# Patient Record
Sex: MALE | Race: WHITE | NOT HISPANIC OR LATINO | Employment: FULL TIME | URBAN - METROPOLITAN AREA
[De-identification: names, ages, dates, MRNs, and addresses within clinical notes are randomized per-mention and may not be internally consistent; named-entity substitution may affect disease eponyms.]

---
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
## 2019-02-15 ENCOUNTER — TELEPHONE (OUTPATIENT)
Dept: FAMILY MEDICINE | Facility: CLINIC | Age: 19
End: 2019-02-15

## 2019-02-15 DIAGNOSIS — D69.3 IDIOPATHIC THROMBOCYTOPENIC PURPURA (H): Primary | ICD-10-CM

## 2019-02-15 DIAGNOSIS — K08.409 H/O TOOTH EXTRACTION: Primary | ICD-10-CM

## 2019-02-15 LAB
BASOPHILS # BLD AUTO: 0 10E9/L (ref 0–0.2)
BASOPHILS NFR BLD AUTO: 0.6 %
DIFFERENTIAL METHOD BLD: ABNORMAL
EOSINOPHIL # BLD AUTO: 0.2 10E9/L (ref 0–0.7)
EOSINOPHIL NFR BLD AUTO: 3.5 %
ERYTHROCYTE [DISTWIDTH] IN BLOOD BY AUTOMATED COUNT: 12.5 % (ref 10–15)
HCT VFR BLD AUTO: 49.1 % (ref 40–53)
HGB BLD-MCNC: 16.9 G/DL (ref 13.3–17.7)
LYMPHOCYTES # BLD AUTO: 2.2 10E9/L (ref 0.8–5.3)
LYMPHOCYTES NFR BLD AUTO: 33.3 %
MCH RBC QN AUTO: 29.2 PG (ref 26.5–33)
MCHC RBC AUTO-ENTMCNC: 34.4 G/DL (ref 31.5–36.5)
MCV RBC AUTO: 85 FL (ref 78–100)
MONOCYTES # BLD AUTO: 0.6 10E9/L (ref 0–1.3)
MONOCYTES NFR BLD AUTO: 8.9 %
NEUTROPHILS # BLD AUTO: 3.5 10E9/L (ref 1.6–8.3)
NEUTROPHILS NFR BLD AUTO: 53.7 %
PLATELET # BLD AUTO: 59 10E9/L (ref 150–450)
RBC # BLD AUTO: 5.78 10E12/L (ref 4.4–5.9)
WBC # BLD AUTO: 6.5 10E9/L (ref 4–11)

## 2019-02-15 PROCEDURE — 36415 COLL VENOUS BLD VENIPUNCTURE: CPT | Performed by: DENTIST

## 2019-02-15 PROCEDURE — 85025 COMPLETE CBC W/AUTO DIFF WBC: CPT | Performed by: DENTIST

## 2019-02-15 NOTE — TELEPHONE ENCOUNTER
Has not seen Dr. Sandhu since 2017  Has seen Evelyn Mendez NP and Dr. Hackett more recently    Please advise    Priscila Ashby RN    
Order placed.   Called and spoke with Julissa from [facility]. States they just got the fax from our office.   Patient completed CBC and was received. No further labs needed.     Alexus Montanez RN  
Reason for Call:  Other call back    Detailed comments: [facility] Wanting to check platelets before performing surgery. Please advise.     Fax: [phone]  Phone Number Patient can be reached at: Other phone number: [phone]     Best Time: any     Can we leave a detailed message on this number? YES    Call taken on 2/15/2019 at 11:43 AM by Reg Lugo    
Thelma for CBC with platelets (indication ITP).    Evelyn Mendez, CNP    
no fever and no chills.

## 2019-03-04 ENCOUNTER — OFFICE VISIT (OUTPATIENT)
Dept: PSYCHOLOGY | Facility: CLINIC | Age: 19
End: 2019-03-04
Attending: PEDIATRICS
Payer: COMMERCIAL

## 2019-03-04 DIAGNOSIS — F32.1 MODERATE SINGLE CURRENT EPISODE OF MAJOR DEPRESSIVE DISORDER (H): Primary | ICD-10-CM

## 2019-03-04 DIAGNOSIS — F41.1 GAD (GENERALIZED ANXIETY DISORDER): ICD-10-CM

## 2019-03-04 PROCEDURE — 90791 PSYCH DIAGNOSTIC EVALUATION: CPT | Performed by: COUNSELOR

## 2019-03-04 NOTE — Clinical Note
Good evening Evelyn,I met with Fredy yesterday. He stated that he has now started seeing you as his primary provider. He and I talked a lot about his symptoms and what he thinks will be beneficial at helping him be successful. He is also scheduled to see me again. I did tell him that if at some point he would prefer a male therapist, that is also an option.Cydney
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
## 2019-05-02 ENCOUNTER — OFFICE VISIT (OUTPATIENT)
Dept: FAMILY MEDICINE | Facility: CLINIC | Age: 19
End: 2019-05-02
Payer: COMMERCIAL

## 2019-05-02 VITALS
HEART RATE: 93 BPM | WEIGHT: 257 LBS | OXYGEN SATURATION: 98 % | TEMPERATURE: 99.3 F | SYSTOLIC BLOOD PRESSURE: 127 MMHG | DIASTOLIC BLOOD PRESSURE: 79 MMHG | RESPIRATION RATE: 20 BRPM | BODY MASS INDEX: 36.1 KG/M2

## 2019-05-02 DIAGNOSIS — M76.31 ILIOTIBIAL BAND SYNDROME, RIGHT: ICD-10-CM

## 2019-05-02 DIAGNOSIS — Z11.4 SCREENING FOR HIV (HUMAN IMMUNODEFICIENCY VIRUS): Primary | ICD-10-CM

## 2019-05-02 DIAGNOSIS — S76.011A STRAIN OF FLEXOR MUSCLE OF RIGHT HIP, INITIAL ENCOUNTER: ICD-10-CM

## 2019-05-02 PROCEDURE — 99214 OFFICE O/P EST MOD 30 MIN: CPT | Performed by: FAMILY MEDICINE

## 2019-05-02 ASSESSMENT — PAIN SCALES - GENERAL: PAINLEVEL: MILD PAIN (3)

## 2019-05-02 NOTE — NURSING NOTE
"Chief Complaint   Patient presents with     Musculoskeletal Problem     right side hip pain, mildly for about 1 month and Monday started to get worse     Health Maintenance     order pended,       Initial /79   Pulse 93   Temp 99.3  F (37.4  C) (Oral)   Resp 20   Wt 116.6 kg (257 lb)   SpO2 98%   BMI 36.10 kg/m   Estimated body mass index is 36.1 kg/m  as calculated from the following:    Height as of 12/26/18: 1.797 m (5' 10.75\").    Weight as of this encounter: 116.6 kg (257 lb).  Medication Reconciliation: complete  Anamaria Segovia CMA  "

## 2019-05-02 NOTE — LETTER
[facility]  [address] [facility] [id number] [phone]          May 2, 2019    RE:  Fredy Mora                                                                                                                                                       [address] [place] [id number]            To whom it may concern:    Fredy Mora is under my professional care for    Strain of flexor muscle of right hip, initial encounter  Iliotibial band syndrome, right  He  may return to work with the following: The employee is ABLE to return to work today.    When the patient returns to work, the following restrictions apply until 5-10-19:  He can perform seated or standing work.    Limitations include:  Bend: Not at all (0 hours)  Squat: Not at all (0 hours)   Walk/Stand: he can walk occasionally but not continuously  Lift, carry, push, and pull no more than: 20 lbs.        Sincerely,        Tae Lr MD

## 2019-05-02 NOTE — PROGRESS NOTES
SUBJECTIVE:  Fredy Mora is a 18 year old male who is seen for  right hip pain.   He reports that a few month(s) ago he had a sudden onset of pain but it was very mild     It got a lot worse 3 day(s) ago.   He was working at [employer] and was lifting when it got worse.      Modifying Factors:     Pain Improved with:rest    Pain Worsened with: walking long distance or catch [id number] after tripping     Prior history of related problems: no prior problems with this area in the past.    Patients past medical, surgical, social and family histories reviewed.     REVIEW OF SYSTEMS:  CONSTITUTIONAL:NEGATIVE for fever, chills, change in weight      OBJECTIVE:   /79   Pulse 93   Temp 99.3  F (37.4  C) (Oral)   Resp 20   Wt 116.6 kg (257 lb)   SpO2 98%   BMI 36.10 kg/m        EXAM:  GENERAL APPEARANCE: healthy, alert and no distress   GAIT: NORMAL  SKIN: no suspicious lesions or rashes  NEURO: normal strength and tone, sentation intact, reflexes normal, DTR symmetrically normal in upper extremities and DTR symmetrically normal in lower extremities  PSYCH:  mentation appears normal and affect normal/bright    MUSCULOSKELETAL:  RIGHT HIP:  Palpation: Tender:   right proximal ITB, sartorius and iliopsoas  Non-tender:  right greater trochanter    Strength:  full strength  Special tests:  positive Erin's  Right, DIVINA negative       Pain was not ilicited with passive external and internal rotation of the right or left hip      Negative Sacroiliac Provocation Tests:  Divina (causing pain in sacroiliac area on right)  Thigh Thrust Test  (With the patient supine, the examiner places one hand under their sacrum and then flexes their hip and knee both to 90 degrees. With the patient in this position, the examiner applies pressure downward along the axis of the femur. Pain at the ilium or SIJ suggests SIJ dysfunction [5].       Lumbar Spine Testing:  Negative straight leg raising     X-RAY INTERPRETATION:  No Hip X-Rays  indicated    ASSESSMENT      (S76.011A) Strain of flexor muscle of right hip, initial encounter  Comment:   Plan: nabumetone (RELAFEN) 750 MG tablet, [facility],         HAND, AND CHIROPRACTIC REFERRAL            (M76.31) Iliotibial band syndrome, right  Comment:   Plan: nabumetone (RELAFEN) 750 MG tablet, [facility],         HAND, AND CHIROPRACTIC REFERRAL

## 2019-06-03 ENCOUNTER — ANCILLARY PROCEDURE (OUTPATIENT)
Dept: GENERAL RADIOLOGY | Facility: CLINIC | Age: 19
End: 2019-06-03
Attending: PEDIATRICS
Payer: COMMERCIAL

## 2019-06-03 ENCOUNTER — OFFICE VISIT (OUTPATIENT)
Dept: PEDIATRICS | Facility: CLINIC | Age: 19
End: 2019-06-03
Payer: COMMERCIAL

## 2019-06-03 VITALS
WEIGHT: 260 LBS | DIASTOLIC BLOOD PRESSURE: 88 MMHG | TEMPERATURE: 98.1 F | HEART RATE: 89 BPM | SYSTOLIC BLOOD PRESSURE: 135 MMHG | BODY MASS INDEX: 36.52 KG/M2

## 2019-06-03 DIAGNOSIS — M25.551 HIP PAIN, RIGHT: Primary | ICD-10-CM

## 2019-06-03 DIAGNOSIS — E66.812 CLASS 2 SEVERE OBESITY WITH SERIOUS COMORBIDITY AND BODY MASS INDEX (BMI) OF 36.0 TO 36.9 IN ADULT, UNSPECIFIED OBESITY TYPE (H): ICD-10-CM

## 2019-06-03 DIAGNOSIS — E66.01 CLASS 2 SEVERE OBESITY WITH SERIOUS COMORBIDITY AND BODY MASS INDEX (BMI) OF 36.0 TO 36.9 IN ADULT, UNSPECIFIED OBESITY TYPE (H): ICD-10-CM

## 2019-06-03 DIAGNOSIS — M25.551 HIP PAIN, RIGHT: ICD-10-CM

## 2019-06-03 DIAGNOSIS — E66.01 MORBID OBESITY (H): ICD-10-CM

## 2019-06-03 PROCEDURE — 72170 X-RAY EXAM OF PELVIS: CPT

## 2019-06-03 PROCEDURE — 99213 OFFICE O/P EST LOW 20 MIN: CPT | Performed by: PEDIATRICS

## 2019-06-03 NOTE — LETTER
Cierra 3, 2019      Fredy Mora  [address] [place] [id number]        To Whom It May Concern:    Fredy Mora was seen in our clinic today. Please accomodate his work to not involve heavy lifting.    Sincerely,        Tony Manzo MD

## 2019-06-03 NOTE — PROGRESS NOTES
Subjective    Fredy Mora is a 18 year old male who presents to clinic today with  because of:  Hip Pain (right hip )     HPI   Concerns: Patient c/o right side hip pain with walking and lifting X 3 months. Seen on 5/2 by Dr Lr, given nabumetone X 10 days,  improved while taking medication,but since completed has been having bad pain again. Pt was referred to PT a month ago, but never called for an appt.He has been lifting heavy objects at work- that makes pain worse, he would like a note for accomodation at work.        Review of Systems  Constitutional, eye, ENT, skin, respiratory, cardiac, and GI are normal except as otherwise noted.  PROBLEM LIST  Patient Active Problem List    Diagnosis Date Noted     Moderate single current episode of major depressive disorder (H) 09/05/2018     Priority: Medium     PARMINDER (generalized anxiety disorder) 09/05/2018     Priority: Medium     Tobacco abuse 05/08/2018     Priority: Medium     Obesity      Priority: Medium     Idiopathic thrombocytopenic purpura (H) 04/03/2017     Priority: Medium      MEDICATIONS    Current Outpatient Medications on File Prior to Visit:  Ibuprofen (ADVIL PO) Take 400 mg by mouth   nabumetone (RELAFEN) 750 MG tablet 1 tablet twice a day for 10 days and then twice a day only as needed (Patient not taking: Reported on 6/3/2019)     No current facility-administered medications on file prior to visit.   ALLERGIES  No Known Allergies  Reviewed and updated as needed this visit by Provider           Objective    /88   Pulse 89   Temp 98.1  F (36.7  C) (Oral)   Wt 260 lb (117.9 kg)   BMI 36.52 kg/m    No height on file for this encounter.  >99 %ile based on CDC (Boys, 2-20 Years) weight-for-age data based on Weight recorded on 6/3/2019.  99 %ile based on CDC (Boys, 2-20 Years) BMI-for-age data using weight from 6/3/2019 and height from 12/26/2018.    Physical Exam  GENERAL: Active, alert, in no acute distress.Obese.  SKIN: Clear. No  significant rash, abnormal pigmentation or lesions  HEAD: Normocephalic.  EYES:  No discharge or erythema. Normal pupils and EOM.  LUNGS: Clear. No rales, rhonchi, wheezing or retractions  HEART: Regular rhythm. Normal S1/S2. No murmurs.  ABDOMEN: Soft, non-tender, not distended, no masses or hepatosplenomegaly. Bowel sounds normal.   EXTREMITIES: Full range of motion, no deformities. Tenderness on palpation below right hip, no masses  BACK:  Straight, no scoliosis.  NEUROLOGIC: No focal findings. Cranial nerves grossly intact: DTR's normal. Normal gait, strength and tone  Diagnostics: X-ray of hips:  Narrowing of hip joint space bilaterally      Assessment    Chronic pain in right hip area with narrowing of hip joint space bilat; Obesity  Referral to ortho- pt to call scheduling line  Pt given a note for work accomodation  Pt will call PT in [place] for an appt  FOLLOW UP: If not improving or if worsening  Tony Manzo MD

## 2019-06-04 ENCOUNTER — OFFICE VISIT (OUTPATIENT)
Dept: ORTHOPEDICS | Facility: CLINIC | Age: 19
End: 2019-06-04
Payer: COMMERCIAL

## 2019-06-04 VITALS
SYSTOLIC BLOOD PRESSURE: 134 MMHG | HEIGHT: 71 IN | HEART RATE: 104 BPM | OXYGEN SATURATION: 98 % | WEIGHT: 257 LBS | DIASTOLIC BLOOD PRESSURE: 83 MMHG | BODY MASS INDEX: 35.98 KG/M2

## 2019-06-04 DIAGNOSIS — M25.851 FEMOROACETABULAR IMPINGEMENT OF RIGHT HIP: Primary | ICD-10-CM

## 2019-06-04 PROCEDURE — 99243 OFF/OP CNSLTJ NEW/EST LOW 30: CPT | Performed by: ORTHOPAEDIC SURGERY

## 2019-06-04 ASSESSMENT — MIFFLIN-ST. JEOR: SCORE: 2202.87

## 2019-06-04 NOTE — LETTER
6/4/2019         RE: Fredy Mora  [address] [place] [id number]        Dear Colleague,    Thank you for referring your patient, Fredy Mora, to the [facility]. Please see a copy of my visit note below.    SUBJECTIVE:   Fredy Mora is a 18 year old male who is seen in consultation at the request of Dr. Manzo for evaluation of right hip pain that has been present for 3 months. No known injury. Started a few months ago with mild right hip pain. Worsened significantly about 5 weeks ago.     Present symptoms: anterior hip pain, severe, comes and goes. If he does certain activities he will get a severe pain in his hip the next day.   Symptoms occur when: long strides, doing a lot of walking, stepping down with the right foot, sudden jostling/stomping with the right leg makes it more painful, squatting or flexing the hip.     Treatments tried to this point: Tylenol, cannot take NSAIDs due to ITP. He moved areas at his job so that he is not doing the movements that cause him the most pain.     Orthopedic PMH: none. Denies back problems.     Review of Systems:  Constitutional:  NEGATIVE for fever, chills, change in weight  Integumentary/Skin:  NEGATIVE for worrisome rashes, moles or lesions  Eyes:  NEGATIVE for vision changes or irritation  ENT/Mouth:  NEGATIVE for ear, mouth and throat problems  Resp:  NEGATIVE for significant cough or SOB  CV:  NEGATIVE for chest pain, palpitations or peripheral edema  GI:  NEGATIVE for nausea, abdominal pain, heartburn, or change in bowel habits  Musculoskeletal:  See HPI above  Neuro:  NEGATIVE for weakness, dizziness or paresthesias  Endocrine:  NEGATIVE for temperature intolerance, skin/hair changes  Heme/allergy/immune:  NEGATIVE for bleeding problems  Psychiatric:  NEGATIVE for changes in mood or affect    This document serves as a record of the services and decisions personally performed and made by Dr. CHRISTAL Obregon MD. It was created  "on his behalf by Nichelle Ferrell, a trained medical scribe. The creation of this record is based on the provider's personal observations and the statements of the patient.  Nichelle Ferrell June 4, 2019 3:06 PM    Past Medical History:   Past Medical History:   Diagnosis Date     Idiopathic thrombocytopenic purpura (H)      Idiopathic thrombocytopenic purpura (H)      Moderate single current episode of major depressive disorder (H) 9/5/2018     Obesity      Past Surgical History: No past surgical history on file.  Family History: No family history on file.  Social History:   Social History     Tobacco Use     Smoking status: Current Every Day Smoker     Packs/day: 0.50     Years: 1.00     Pack years: 0.50     Types: Cigarettes     Smokeless tobacco: Never Used   Substance Use Topics     Alcohol use: Yes     Comment: denies any use recently     OBJECTIVE:  Physical Exam:  /83 (BP Location: Left arm, Patient Position: Sitting, Cuff Size: Adult Regular)   Pulse 104   Ht 1.803 m (5' 11\")   Wt 116.6 kg (257 lb)   SpO2 98%   BMI 35.84 kg/m     General Appearance: healthy, alert and no distress   Skin: no suspicious lesions or rashes  Neuro: Normal strength and tone, mentation intact and speech normal  Vascular: good pulses, and cappillary refill   Lymph: no lymphadenopathy   Psych:  mentation appears normal and affect normal/bright  Resp: no increased work of breathing     Right Hip Exam:  Lumbar range of motion:    Flexion: to touch toes   Extension: causes some pulling in the anterior hip   Side bending: left lateral tilt causes some right sided pain  Toe stand: Able  Heel stand: Able  Squat: Pain when doing squat, unable to do full squat  Gait: Normal gait     Tender: non tender around the hips  Hip range of motion:    Flexion: Full with pain in right hip with no flexion contracture   Internal Rotation: 5* degrees   External Rotation: 50 degrees   Abduction: Good without reproducing pain  Strength: Pain with active " flexion of the hip  Leg lengths: not tested  Special tests:  DARVIN negative, FADIR positive, Femoral stretch painful, Erin's equivocal    X-rays:  Obtained 6/3/19 of the Pelvis: 1/2-views, reviewed in the office with the patient by myself today and show bilateral cam lesions on the femoral heads. The radiologist felt that the hip joints were narrow, but I think that this is physiologic for this patient, or may be projectional      ASSESSMENT:   1. Femoroacetabular impingement of right hip.   2. Possible Labral tear  3.  Possible iliopsoas bursitis    PLAN:   Recommended physical therapy for hip impingement. For pain relief, recommended tylenol, since it is so effective for him, no more than 3000 mg daily. If the pain is not improved in 6 weeks with physical therapy, then an MRI arthrogram can be ordered.     Return to clinic: 6-8 weeks if not improved.     The information in this document, created by the medical scribe for me, accurately reflects the services I personally performed and the decisions made by me. I have reviewed and approved this document for accuracy.     CHRISTAL Obregon MD  Dept. Orthopedic Surgery  [facility]       Again, thank you for allowing me to participate in the care of your patient.        Sincerely,        Sidney Obregon MD

## 2019-06-04 NOTE — PROGRESS NOTES
SUBJECTIVE:   Fredy Mora is a 18 year old male who is seen in consultation at the request of Dr. Manzo for evaluation of right hip pain that has been present for 3 months. No known injury. Started a few months ago with mild right hip pain. Worsened significantly about 5 weeks ago.     Present symptoms: anterior hip pain, severe, comes and goes. If he does certain activities he will get a severe pain in his hip the next day.   Symptoms occur when: long strides, doing a lot of walking, stepping down with the right foot, sudden jostling/stomping with the right leg makes it more painful, squatting or flexing the hip.     Treatments tried to this point: Tylenol, cannot take NSAIDs due to ITP. He moved areas at his job so that he is not doing the movements that cause him the most pain.     Orthopedic PMH: none. Denies back problems.     Review of Systems:  Constitutional:  NEGATIVE for fever, chills, change in weight  Integumentary/Skin:  NEGATIVE for worrisome rashes, moles or lesions  Eyes:  NEGATIVE for vision changes or irritation  ENT/Mouth:  NEGATIVE for ear, mouth and throat problems  Resp:  NEGATIVE for significant cough or SOB  CV:  NEGATIVE for chest pain, palpitations or peripheral edema  GI:  NEGATIVE for nausea, abdominal pain, heartburn, or change in bowel habits  Musculoskeletal:  See HPI above  Neuro:  NEGATIVE for weakness, dizziness or paresthesias  Endocrine:  NEGATIVE for temperature intolerance, skin/hair changes  Heme/allergy/immune:  NEGATIVE for bleeding problems  Psychiatric:  NEGATIVE for changes in mood or affect    This document serves as a record of the services and decisions personally performed and made by Dr. CHRISTAL Obregon MD. It was created on his behalf by Nichelle Ferrell, a trained medical scribe. The creation of this record is based on the provider's personal observations and the statements of the patient.  Nichelle Ferrell June 4, 2019 3:06 PM    Past Medical History:   Past Medical  "History:   Diagnosis Date     Idiopathic thrombocytopenic purpura (H)      Idiopathic thrombocytopenic purpura (H)      Moderate single current episode of major depressive disorder (H) 9/5/2018     Obesity      Past Surgical History: No past surgical history on file.  Family History: No family history on file.  Social History:   Social History     Tobacco Use     Smoking status: Current Every Day Smoker     Packs/day: 0.50     Years: 1.00     Pack years: 0.50     Types: Cigarettes     Smokeless tobacco: Never Used   Substance Use Topics     Alcohol use: Yes     Comment: denies any use recently     OBJECTIVE:  Physical Exam:  /83 (BP Location: Left arm, Patient Position: Sitting, Cuff Size: Adult Regular)   Pulse 104   Ht 1.803 m (5' 11\")   Wt 116.6 kg (257 lb)   SpO2 98%   BMI 35.84 kg/m    General Appearance: healthy, alert and no distress   Skin: no suspicious lesions or rashes  Neuro: Normal strength and tone, mentation intact and speech normal  Vascular: good pulses, and cappillary refill   Lymph: no lymphadenopathy   Psych:  mentation appears normal and affect normal/bright  Resp: no increased work of breathing     Right Hip Exam:  Lumbar range of motion:    Flexion: to touch toes   Extension: causes some pulling in the anterior hip   Side bending: left lateral tilt causes some right sided pain  Toe stand: Able  Heel stand: Able  Squat: Pain when doing squat, unable to do full squat  Gait: Normal gait     Tender: non tender around the hips  Hip range of motion:    Flexion: Full with pain in right hip with no flexion contracture   Internal Rotation: 5* degrees   External Rotation: 50 degrees   Abduction: Good without reproducing pain  Strength: Pain with active flexion of the hip  Leg lengths: not tested  Special tests:  DARVIN negative, FADIR positive, Femoral stretch painful, Erin's equivocal    X-rays:  Obtained 6/3/19 of the Pelvis: 1/2-views, reviewed in the office with the patient by myself today " and show bilateral cam lesions on the femoral heads. The radiologist felt that the hip joints were narrow, but I think that this is physiologic for this patient, or may be projectional      ASSESSMENT:   1. Femoroacetabular impingement of right hip.   2. Possible Labral tear  3.  Possible iliopsoas bursitis    PLAN:   Recommended physical therapy for hip impingement. For pain relief, recommended tylenol, since it is so effective for him, no more than 3000 mg daily. If the pain is not improved in 6 weeks with physical therapy, then an MRI arthrogram can be ordered.     Return to clinic: 6-8 weeks if not improved.     The information in this document, created by the medical scribe for me, accurately reflects the services I personally performed and the decisions made by me. I have reviewed and approved this document for accuracy.     CHRISTAL Obregon MD  Dept. Orthopedic Surgery  [facility]

## 2019-06-12 ENCOUNTER — THERAPY VISIT (OUTPATIENT)
Dept: PHYSICAL THERAPY | Facility: CLINIC | Age: 19
End: 2019-06-12
Attending: ORTHOPAEDIC SURGERY
Payer: COMMERCIAL

## 2019-06-12 DIAGNOSIS — M25.851 FEMOROACETABULAR IMPINGEMENT OF RIGHT HIP: ICD-10-CM

## 2019-06-12 DIAGNOSIS — M25.551 HIP PAIN, RIGHT: ICD-10-CM

## 2019-06-12 PROCEDURE — 97161 PT EVAL LOW COMPLEX 20 MIN: CPT | Mod: GP | Performed by: PHYSICAL THERAPIST

## 2019-06-12 PROCEDURE — 97530 THERAPEUTIC ACTIVITIES: CPT | Mod: GP | Performed by: PHYSICAL THERAPIST

## 2019-06-12 PROCEDURE — 97140 MANUAL THERAPY 1/> REGIONS: CPT | Mod: GP | Performed by: PHYSICAL THERAPIST

## 2019-06-12 PROCEDURE — 97110 THERAPEUTIC EXERCISES: CPT | Mod: GP | Performed by: PHYSICAL THERAPIST

## 2019-06-12 ASSESSMENT — ACTIVITIES OF DAILY LIVING (ADL)
STEPPING_UP_AND_DOWN_CURBS: NO DIFFICULTY AT ALL
STANDING_FOR_15_MINUTES: NO DIFFICULTY AT ALL
HEAVY_WORK: SLIGHT DIFFICULTY
HOS_ADL_COUNT: 16
HOS_ADL_SCORE(%): 75
SITTING_FOR_15_MINUTES: NO DIFFICULTY AT ALL
TWISTING/PIVOTING_ON_INVOLVED_LEG: MODERATE DIFFICULTY
HOS_ADL_ITEM_SCORE_TOTAL: 48
WALKING_15_MINUTES_OR_GREATER: EXTREME DIFFICULTY
WALKING_APPROXIMATELY_10_MINUTES: MODERATE DIFFICULTY
WALKING_UP_STEEP_HILLS: SLIGHT DIFFICULTY
GETTING_INTO_AND_OUT_OF_A_BATHTUB: NO DIFFICULTY AT ALL
PUTTING_ON_SOCKS_AND_SHOES: NO DIFFICULTY AT ALL
GOING_UP_1_FLIGHT_OF_STAIRS: NO DIFFICULTY AT ALL
ROLLING_OVER_IN_BED: SLIGHT DIFFICULTY
GOING_DOWN_1_FLIGHT_OF_STAIRS: NO DIFFICULTY AT ALL
WALKING_DOWN_STEEP_HILLS: SLIGHT DIFFICULTY
GETTING_INTO_AND_OUT_OF_AN_AVERAGE_CAR: SLIGHT DIFFICULTY
LIGHT_TO_MODERATE_WORK: NO DIFFICULTY AT ALL
WALKING_INITIALLY: NO DIFFICULTY AT ALL
HOS_ADL_HIGHEST_POTENTIAL_SCORE: 64
DEEP_SQUATTING: UNABLE TO DO

## 2019-06-12 NOTE — PROGRESS NOTES
[facility] Initial Evaluation  Subjective:    Fredy Mora is a 18 year old male with a right hip condition.  Condition occurred with:  Insidious onset.  Condition occurred: for unknown reasons.  This is a chronic condition  Patient reports onset of right hip pain in February 2019 without a specific mechanism of injury or change in daily routine. Patient reports having a bony impingement..    Patient reports pain:  Anterior.  Radiates to:  No radiation.  Pain is described as aching and sharp and is constant and reported as 3/10.  Associated symptoms:  Loss of motion/stiffness. Pain is worse during the day.  Symptoms are exacerbated by descending stairs, ascending stairs, bending/squatting and walking and relieved by nothing.  Since onset symptoms are unchanged.  Special tests:  X-ray (see epic).      General health as reported by patient is good.                      Red flags:  None as reported by the patient.                        Objective:    Gait:      Deviations:  General Deviations:  Stride length decr and stance time decr                                                 Hip Evaluation  HIP AROM:    Flexion: Left:   Right:  90 deg    Extension: Left:   Right:  10 deg  Abduction: Left:    Right:  30 deg    Adduction: Left:   Right: midline, painful  Internal Rotation: Left:   Right: 0 deg, painful  External Rotation: Left:   Right: WNL      Hip PROM:    Flexion: Left:  Right: 110 deg                        Hip Strength:    Flexion:   Left: 4+/5   Pain:  Right: 4+/5   Pain:                    Extension:  Left: 4/5  Pain:Right: 4/5    Pain:    Abduction:  Left: 4-/5     Pain:Right: 4-/5    Pain:  Adduction:  Left: 4+/5    Pain:Right: 4+/5   Pain:  Internal Rotation:  Left: 4/5    Pain:Right: 4/5   Pain:  External Rotation:  Left: 4/5   Pain:  Right: 4/5   Pain:            Hip Special Testing:       Right hip positive for the following special tests:  Elizabeth; Fadir/Labrum and Distraction     Hip Palpation:      Right hip tenderness present at:  hip flexors  Functional Testing:          Quad:      Bilateral leg squat:  Moderate loss of control, fermoral IR and excessive anterior knee excursion                  General     ROS    Assessment/Plan:    Patient is a 18 year old male with right side hip complaints.    Patient has the following significant findings with corresponding treatment plan.                Diagnosis 1:  Right hip pain  Pain -  hot/cold therapy, self management, education and home program  Decreased ROM/flexibility - manual therapy, therapeutic exercise, therapeutic activity and home program  Decreased joint mobility - manual therapy, therapeutic exercise, therapeutic activity and home program  Decreased strength - therapeutic exercise, therapeutic activities and home program  Impaired gait - home program  Decreased function - therapeutic activities and home program    Therapy Evaluation Codes:   1) History comprised of:   Personal factors that impact the plan of care:      None.    Comorbidity factors that impact the plan of care are:      None.     Medications impacting care: None.  2) Examination of Body Systems comprised of:   Body structures and functions that impact the plan of care:      Hip.   Activity limitations that impact the plan of care are:      Bending, Lifting, Squatting/kneeling and Stairs.  3) Clinical presentation characteristics are:   Stable/Uncomplicated.  4) Decision-Making    Low complexity using standardized patient assessment instrument and/or measureable assessment of functional outcome.  Cumulative Therapy Evaluation is: Low complexity.    Previous and current functional limitations:  (See Goal Flow Sheet for this information)    Short term and Long term goals: (See Goal Flow Sheet for this information)     Communication ability:  Patient appears to be able to clearly communicate and understand verbal and written communication and follow directions  correctly.  Treatment Explanation - The following has been discussed with the patient:   RX ordered/plan of care  Anticipated outcomes  Possible risks and side effects  This patient would benefit from PT intervention to resume normal activities.   Rehab potential is good.    Frequency:  1xweek, once daily  Duration:  for 6 weeks  Discharge Plan:  Achieve all LTG.  Independent in home treatment program.  Reach maximal therapeutic benefit.    Please refer to the daily flowsheet for treatment today, total treatment time and time spent performing 1:1 timed codes.

## 2019-06-12 NOTE — LETTER
ELAINA AHUMADA PT  [address] [facility] [facility]  [address]  [place] [address]  [phone]    2019    Re: Fredy Mora   :   2000  MRN:  [id number]   REFERRING PHYSICIAN:   MD ELAINA Bethea PT  Date of Initial Evaluation:  2019  Visits:  Rxs Used: 1  Reason for Referral:     Femoroacetabular impingement of right hip  Hip pain, right    EVALUATION SUMMARY    [facility] Initial Evaluation  Subjective:  Fredy Mora is a 18 year old male with a right hip condition.  Condition occurred with:  Insidious onset.  Condition occurred: for unknown reasons.  This is a chronic condition  Patient reports onset of right hip pain in 2019 without a specific mechanism of injury or change in daily routine. Patient reports having a bony impingement..    Patient reports pain:  Anterior.  Radiates to:  No radiation.  Pain is described as aching and sharp and is constant and reported as 3/10.  Associated symptoms:  Loss of motion/stiffness. Pain is worse during the day.  Symptoms are exacerbated by descending stairs, ascending stairs, bending/squatting and walking and relieved by nothing.  Since onset symptoms are unchanged.  Special tests:  X-ray (see epic).      General health as reported by patient is good.                  Red flags:  None as reported by the patient.        Objective:  Gait:    Deviations:  General Deviations:  Stride length decr and stance time decr    Hip Evaluation  HIP AROM:    Flexion: Left:   Right:  90 deg  Extension: Left:   Right:  10 deg  Abduction: Left:    Right:  30 deg  Adduction: Left:   Right: midline, painful  Internal Rotation: Left:   Right: 0 deg, painful  External Rotation: Left:   Right: WNL    Hip PROM:    Flexion: Left:  Right: 110 deg      Re: Fredy Mora   :   2000      Hip Strength:    Flexion:   Left: 4+/5   Pain:  Right: 4+/5   Pain:          Extension:  Left: 4/5  Pain:Right: 4/5    Pain:     Abduction:  Left: 4-/5     Pain:Right: 4-/5    Pain:  Adduction:  Left: 4+/5    Pain:Right: 4+/5   Pain:  Internal Rotation:  Left: 4/5    Pain:Right: 4/5   Pain:  External Rotation:  Left: 4/5   Pain:  Right: 4/5   Pain:    Hip Special Testing:    Right hip positive for the following special tests:  Elizabeth; Fadir/Labrum and Distraction      Hip Palpation:    Right hip tenderness present at:  hip flexors    Functional Testing:      Quad:    Bilateral leg squat:  Moderate loss of control, fermoral IR and excessive anterior knee excursion     Assessment/Plan:    Patient is a 18 year old male with right side hip complaints.    Patient has the following significant findings with corresponding treatment plan.                Diagnosis 1:  Right hip pain  Pain -  hot/cold therapy, self management, education and home program  Decreased ROM/flexibility - manual therapy, therapeutic exercise, therapeutic activity and home program  Decreased joint mobility - manual therapy, therapeutic exercise, therapeutic activity and home program  Decreased strength - therapeutic exercise, therapeutic activities and home program  Impaired gait - home program  Decreased function - therapeutic activities and home program    Therapy Evaluation Codes:   1) History comprised of:   Personal factors that impact the plan of care:      None.    Comorbidity factors that impact the plan of care are:      None.     Medications impacting care: None.  2) Examination of Body Systems comprised of:   Body structures and functions that impact the plan of care:      Hip.   Activity limitations that impact the plan of care are:      Bending, Lifting, Squatting/kneeling and Stairs.  3) Clinical presentation characteristics are:   Stable/Uncomplicated.  4) Decision-Making    Low complexity using standardized patient assessment instrument and/or measureable assessment of functional outcome.  Re: Fredy Mora   :   2000    Cumulative Therapy  Evaluation is: Low complexity.    Previous and current functional limitations:  (See Goal Flow Sheet for this information)    Short term and Long term goals: (See Goal Flow Sheet for this information)     Communication ability:  Patient appears to be able to clearly communicate and understand verbal and written communication and follow directions correctly.  Treatment Explanation - The following has been discussed with the patient:   RX ordered/plan of care  Anticipated outcomes  Possible risks and side effects  This patient would benefit from PT intervention to resume normal activities.   Rehab potential is good.    Frequency:  1xweek, once daily  Duration:  for 6 weeks  Discharge Plan:  Achieve all LTG.  Independent in home treatment program.  Reach maximal therapeutic benefit.    Please refer to the daily flowsheet for treatment today, total treatment time and time spent performing 1:1 timed codes.         Thank you for your referral.    INQUIRIES  Therapist: DOMINIC Richmond PT  [address] [facility] [address]  [address]  [place] [address]  Phone: [phone]  Fax: [phone]

## 2019-06-19 ENCOUNTER — THERAPY VISIT (OUTPATIENT)
Dept: PHYSICAL THERAPY | Facility: CLINIC | Age: 19
End: 2019-06-19
Payer: COMMERCIAL

## 2019-06-19 DIAGNOSIS — M25.551 HIP PAIN, RIGHT: ICD-10-CM

## 2019-06-19 PROCEDURE — 97530 THERAPEUTIC ACTIVITIES: CPT | Mod: GP

## 2019-06-19 PROCEDURE — 97110 THERAPEUTIC EXERCISES: CPT | Mod: GP

## 2019-06-21 NOTE — PROGRESS NOTES
[facility] Initial Evaluation  Subjective:                                     General health as reported by patient is fair.  Pertinent medical history includes:  Overweight and smoking.  Medical allergies: no.  Other surgeries include:  None reported.  Current medications:  None as reported by patient.  Current occupation is [employer].    Primary job tasks include:  Lifting and repetitive tasks.                                Objective:  System    Physical Exam    General     ROS    Assessment/Plan:           JING ambulatory encounter  FAMILY PRACTICE OFFICE VISIT    CHIEF COMPLAINT:    Chief Complaint   Patient presents with   • Follow-up     hip and legs, pressure and numbness   • Toe Pain     right foot, big toe, ongoing for about 1 week       SUBJECTIVE:  Yessica Kerr is a 52 year old female who presented requesting evaluation for     Back is much better. Was carrying 3 year olds on her back when it started hurting. Now it is completely better. Back pain is completely gone. Left hip more than the right. Both of them. Stiffness both sides. Numbness is completely gone.     Hip stiffness progressing on both sides. Sore when laying down. Stiffness is more as the day progresses at work, if walking too much at work it will get stiff. Going on for quite a while. Since living in [place]. Not really any worse.    Toe bothering her. On the right. Has been going on for about a month, not red or draining. Just hurts on the inside of the big toe. Hurts to palpation. No fevers or redness. Has had ingrown toenails before    Review of systems:  Review of Systems  See HPI    PROBLEM LIST:   Patient Active Problem List   Diagnosis   • Hypoglycemia, unspecified   • Tobacco use disorder   • Right wrist pain   • Pain of both hip joints   • Irritable bowel syndrome with constipation   • Gastroesophageal reflux disease   • Fatigue   • Prediabetes       PAST HISTORIES:   Current Outpatient Medications   Medication Sig Dispense Refill   • polyethylene glycol (GLYCOLAX, MIRALAX) packet Take 17 g by mouth daily.     • Misc Natural Products (OSTEO BI-FLEX JOINT SHIELD) Tab Take 1 tablet by mouth daily.     • omeprazole (PRILOSEC) 20 MG capsule Take 1 capsule by mouth daily. 90 capsule 3   • bisacodyl (DULCOLAX) 5 MG EC tablet Take 5 mg by mouth daily as needed for Constipation.     • HYDROcodone-acetaminophen (NORCO) 5-325 MG per tablet Take 1-2 tablets by mouth every 4 hours as needed for Pain. 20 tablet 0   • cyclobenzaprine  (FLEXERIL) 10 MG tablet Take 1 tablet by mouth 3 times daily as needed for Muscle spasms. 15 tablet 0     No current facility-administered medications for this visit.      Past Medical History:   Diagnosis Date   • GERD (gastroesophageal reflux disease)      Past Surgical History:   Procedure Laterality Date   • Total abdominal hysterectomy w/ bilateral salpingoophorectomy  2009       OBJECTIVE:     PHYSICAL EXAM:    weight is 55.7 kg. Her tympanic temperature is 98.6 °F (37 °C). Her blood pressure is 110/60 and her pulse is 76. Her oxygen saturation is 97%.     Physical Exam   Constitutional: She appears well-developed and well-nourished.   HENT:   Head: Normocephalic and atraumatic.   Abdominal: Soft. There is no tenderness.   Musculoskeletal: She exhibits no edema.   Pain to palpation over the anterior aspect of the hips bilaterally over the hip flexor muscles at the insertion site along the inguinal area. Patient has pain with hip flexion bilaterally. There is no pain with hip extension. Normal range of motion of the hip with normal appearing skin without erythema or tenderness over the greater trochanter    Normal pedal pulses and normal sensation    Right great toe mildly tender in the medial aspect without erythema, no paronychia or abscess.   Neurological: She is alert.   Skin: Skin is warm. Capillary refill takes less than 2 seconds.       LAB/IMAGING RESULTS:     Hip xray - mild degenerative changes     ASSESSMENT/PLAN:  1. Chronic pain of both hips: hip xrays obtained, official read is pending suspect there is a muscular component as well as some degenerative changes.. I did discuss physical therapy with the patient which she declines, also discussed that we can refer to orthopedics if she wishes if pain does not improve, she will consider this but we'll see what the x-ray shows.  - XR HIP 2 VW BILATERAL; Future  - gentle stretching  - heat  - tylenol as needed     2. Pain in toe of right foot: may be  ingrown early in the process, but not infected  - advise warm water soaks 4x per day, pat dry  - keep from rubbing on 2nd toe as this causes irritation   - if not improving or worsening, call the clinic early next week to schedule with partner who removes toenails     Back pain has resolved    Also due to calcifications seen on xrays in the ED, patient was advised to take a baby aspirin daily. Side effects reviewed.     The 10-year ASCVD risk score ([place]ste[place] ROMAN Jr., et al., 2013) is: 3.2%    Values used to calculate the score:      Age: 52 years      Sex: Female      Is Non- : No      Diabetic: No      Tobacco smoker: Yes      Systolic Blood Pressure: 110 mmHg      Is BP treated: No      HDL Cholesterol: 62 mg/dL      Total Cholesterol: 223 mg/dL      Follow up next week if needed for toe, sooner PRN     Instructions provided as documented in the after visit summary.    The patient indicated understanding of the diagnosis and agreed with the plan of care.     Mónica Montes DO  06/21/19  8:43 AM

## 2019-06-27 ENCOUNTER — THERAPY VISIT (OUTPATIENT)
Dept: PHYSICAL THERAPY | Facility: CLINIC | Age: 19
End: 2019-06-27
Payer: COMMERCIAL

## 2019-06-27 DIAGNOSIS — M25.551 HIP PAIN, RIGHT: ICD-10-CM

## 2019-06-27 PROCEDURE — 97530 THERAPEUTIC ACTIVITIES: CPT | Mod: GP

## 2019-06-27 PROCEDURE — 97112 NEUROMUSCULAR REEDUCATION: CPT | Mod: GP

## 2019-06-27 PROCEDURE — 97110 THERAPEUTIC EXERCISES: CPT | Mod: GP

## 2019-07-11 ENCOUNTER — THERAPY VISIT (OUTPATIENT)
Dept: PHYSICAL THERAPY | Facility: CLINIC | Age: 19
End: 2019-07-11
Payer: COMMERCIAL

## 2019-07-11 DIAGNOSIS — M25.551 HIP PAIN, RIGHT: ICD-10-CM

## 2019-07-11 PROCEDURE — 97530 THERAPEUTIC ACTIVITIES: CPT | Mod: GP | Performed by: PHYSICAL THERAPIST

## 2019-07-11 PROCEDURE — 97110 THERAPEUTIC EXERCISES: CPT | Mod: GP | Performed by: PHYSICAL THERAPIST

## 2019-07-11 NOTE — PROGRESS NOTES
Subjective:  HPI                    Objective:  System    Physical Exam    General     ROS    Assessment/Plan:    PROGRESS  REPORT    Progress reporting period is from 6/27/2019 to 7/11/2019.       SUBJECTIVE  Subjective changes noted by patient:   Subjective: Patient reports an improvment in pain since starting PT, but he does report continued pain and he continues to take tylenol daily to help with pain. Patient would like to continue with PT, but decrease the frequency of his visits. Patient reports wanting to have an MRI to gain more information about his hip. Patient reports his work responsibilities are causing the most pain.     Current pain level is  Current Pain level: 1/10.     Previous pain level was   1/10.   Changes in function:  Yes (See Goal flowsheet attached for changes in current functional level)  Adverse reaction to treatment or activity: None    OBJECTIVE  Changes noted in objective findings:  Yes,   Objective: No significant changes since the initial evaluation.      ASSESSMENT/PLAN  Updated problem list and treatment plan: Diagnosis 1:  Hip pain  Pain -  hot/cold therapy, manual therapy, self management, education and home program  Decreased ROM/flexibility - manual therapy, therapeutic exercise, therapeutic activity and home program  Decreased joint mobility - manual therapy, therapeutic exercise, therapeutic activity and home program  Decreased strength - therapeutic exercise, therapeutic activities and home program  Impaired gait - home program  Impaired muscle performance - neuro re-education and home program  Decreased function - therapeutic activities and home program  STG/LTGs have been met or progress has been made towards goals:  Yes (See Goal flow sheet completed today.)  Assessment of Progress: The patient's condition is unchanged.  Self Management Plans:  Patient has been instructed in a home treatment program.  Patient  has been instructed in self management of symptoms.  I have  re-evaluated this patient and find that the nature, scope, duration and intensity of the therapy is appropriate for the medical condition of the patient.  Fredy continues to require the following intervention to meet STG and LTG's:  PT    Recommendations:  This patient would benefit from further evaluation.    Please refer to the daily flowsheet for treatment today, total treatment time and time spent performing 1:1 timed codes.

## 2019-07-12 DIAGNOSIS — M25.859 FEMORAL ACETABULAR IMPINGEMENT: Primary | ICD-10-CM

## 2019-07-20 ENCOUNTER — NURSE TRIAGE (OUTPATIENT)
Dept: NURSING | Facility: CLINIC | Age: 19
End: 2019-07-20

## 2019-07-20 NOTE — TELEPHONE ENCOUNTER
Patient's mother calling with patient reporting patient has back pain. Patient rates pain at 8/10. States he has taken Tylenol and has not helped. Per guideline, advised patient to be seen within 4 hours. Caller verbalized understanding. Denies further questions.      Lawrence Leong RN  [facility]      Reason for Disposition    [1] SEVERE back pain (e.g., excruciating, unable to do any normal activities) AND [2] not improved 2 hours after pain medicine    Additional Information    Negative: Passed out (i.e., lost consciousness, collapsed and was not responding)    Negative: Shock suspected (e.g., cold/pale/clammy skin, too weak to stand, low BP, rapid pulse)    Negative: Sounds like a life-threatening emergency to the triager    Negative: [1] SEVERE back pain (e.g., excruciating) AND [2] sudden onset AND [3] age > 60    Negative: [1] Unable to urinate (or only a few drops) > 4 hours AND     [2] bladder feels very full (e.g., palpable bladder or strong urge to urinate)    Negative: [1] Urinary or bowel incontinence (i.e., loss of bladder or bowel control) AND [2] new onset    Negative: Numbness in groin or rectal area (i.e., loss of sensation)    Negative: [1] SEVERE abdominal pain AND [2] present > 1 hour    Negative: [1] Abdominal pain AND [2] age > 60    Negative: Weakness of a leg or foot (e.g., unable to bear weight, dragging foot)    Negative: Unable to walk    Negative: Patient sounds very sick or weak to the triager    Protocols used: BACK PAIN-A-AH

## 2019-08-01 ENCOUNTER — ANCILLARY PROCEDURE (OUTPATIENT)
Dept: MRI IMAGING | Facility: CLINIC | Age: 19
End: 2019-08-01
Attending: ORTHOPAEDIC SURGERY
Payer: COMMERCIAL

## 2019-08-01 ENCOUNTER — TELEPHONE (OUTPATIENT)
Dept: FAMILY MEDICINE | Facility: CLINIC | Age: 19
End: 2019-08-01

## 2019-08-01 ENCOUNTER — ANCILLARY PROCEDURE (OUTPATIENT)
Dept: GENERAL RADIOLOGY | Facility: CLINIC | Age: 19
End: 2019-08-01
Attending: ORTHOPAEDIC SURGERY
Payer: COMMERCIAL

## 2019-08-01 DIAGNOSIS — M25.859 FEMORAL ACETABULAR IMPINGEMENT: ICD-10-CM

## 2019-08-01 LAB — RADIOLOGIST FLAGS: NORMAL

## 2019-08-01 PROCEDURE — 77002 NEEDLE LOCALIZATION BY XRAY: CPT | Performed by: RADIOLOGY

## 2019-08-01 PROCEDURE — 27093 INJECTION FOR HIP X-RAY: CPT | Mod: RT | Performed by: RADIOLOGY

## 2019-08-01 PROCEDURE — A9585 GADOBUTROL INJECTION: HCPCS | Mod: JW | Performed by: ORTHOPAEDIC SURGERY

## 2019-08-01 PROCEDURE — 73722 MRI JOINT OF LWR EXTR W/DYE: CPT | Mod: RT | Performed by: RADIOLOGY

## 2019-08-01 RX ORDER — IOPAMIDOL 408 MG/ML
10 INJECTION, SOLUTION INTRATHECAL ONCE
Status: COMPLETED | OUTPATIENT
Start: 2019-08-01 | End: 2019-08-01

## 2019-08-01 RX ORDER — GADOBUTROL 604.72 MG/ML
0.1 INJECTION INTRAVENOUS ONCE
Status: COMPLETED | OUTPATIENT
Start: 2019-08-01 | End: 2019-08-01

## 2019-08-01 RX ADMIN — IOPAMIDOL 1 ML: 408 INJECTION, SOLUTION INTRATHECAL at 13:38

## 2019-08-01 RX ADMIN — GADOBUTROL 6 ML: 604.72 INJECTION INTRAVENOUS at 13:37

## 2019-08-01 NOTE — TELEPHONE ENCOUNTER
Lyndsey from  [employer] is calling with a finding that provider needs to be aware of     Please call   Thank you

## 2019-08-02 DIAGNOSIS — M25.551 HIP PAIN, RIGHT: Primary | ICD-10-CM

## 2019-08-05 ENCOUNTER — ANCILLARY PROCEDURE (OUTPATIENT)
Dept: CT IMAGING | Facility: CLINIC | Age: 19
End: 2019-08-05
Attending: ORTHOPAEDIC SURGERY
Payer: COMMERCIAL

## 2019-08-05 DIAGNOSIS — M25.551 HIP PAIN, RIGHT: ICD-10-CM

## 2019-08-05 PROCEDURE — 73700 CT LOWER EXTREMITY W/O DYE: CPT | Mod: TC

## 2019-08-08 ENCOUNTER — OFFICE VISIT (OUTPATIENT)
Dept: ORTHOPEDICS | Facility: CLINIC | Age: 19
End: 2019-08-08
Payer: COMMERCIAL

## 2019-08-08 VITALS
DIASTOLIC BLOOD PRESSURE: 87 MMHG | SYSTOLIC BLOOD PRESSURE: 137 MMHG | WEIGHT: 257 LBS | HEIGHT: 71 IN | OXYGEN SATURATION: 98 % | BODY MASS INDEX: 35.98 KG/M2 | HEART RATE: 131 BPM

## 2019-08-08 DIAGNOSIS — M84.353D STRESS FRACTURE OF NECK OF FEMUR WITH ROUTINE HEALING, SUBSEQUENT ENCOUNTER: Primary | ICD-10-CM

## 2019-08-08 PROCEDURE — 99213 OFFICE O/P EST LOW 20 MIN: CPT | Performed by: ORTHOPAEDIC SURGERY

## 2019-08-08 ASSESSMENT — MIFFLIN-ST. JEOR: SCORE: 2197.87

## 2019-08-08 NOTE — PROGRESS NOTES
SUBJECTIVE:  Fredy Mora had an MRI of the right hip which showed:  1. Significant edema this in the femoral neck, most consistent with a  femoral neck stress fracture. Recommend clinical correlation and  further follow-up with sports medicine/orthopedic surgery and  follow-up MRI.  2. No definite labral tear is noted.    Thus he was sent for a CT scan which is reviewed with the patient showed:  What appears to be a lateral/superior cortex fracture extending into the base of the femoral neck, but not into the medial cortex.  The radiologist felt there was not a fracture.    As a reminder, he started having right hip pain in March that worsened significantly at the end of April. No known injury but notes increasing activity. He reports in addition to all the walking at work, he took up long boarding and was doing walks for exercise that he had not done before; a few weeks after starting these activities he started having pain.He started limited weightbearing for about 4-6 days now. Worsened since onset with now having pain even at rest. Has been using crutches when he can. Uses stairs a lot in his home, making it difficult to use crutches effectively. Has admitted to not using crutches occasionally.    Review of Systems:  Constitutional/General: Negative for fever, chills, change in weight  Integumentary/Skin: Negative for worrisome rashes, moles, or lesions  Neuro: Negative for weakness, dizziness, or paresthesias   Psychiatric: negative for changes in mood or affect    This document serves as a record of the services and decisions personally performed and made by CHRISTAL Obregon MD. It was created on his behalf by Grazyna Su, a trained medical scribe. The creation of this document is based the provider's statements to the medical scribe.    Scribe Grazyna Su 9:28 AM 8/8/2019          OBJECTIVE:  Physical Exam:  /87 (BP Location: Right arm, Patient Position: Sitting, Cuff Size: Adult Large)   Pulse  "131   Ht 1.803 m (5' 11\")   Wt 116.6 kg (257 lb)   SpO2 98%   BMI 35.84 kg/m    General Appearance: healthy, alert and no distress   Skin: no suspicious lesions or rashes  Neuro: Normal strength and tone, mentation intact and speech normal  Vascular: good pulses, and capillary refill   Lymph: no lymphadenopathy   Psych:  mentation appears normal and affect normal/bright  Resp: no increased work of breathing    Exam unchanged from 6/4/2019.    ASSESSMENT:   Encounter Diagnosis   Name Primary?     Stress fracture of neck of femur with routine healing, subsequent encounter Yes        PLAN:  We discussed internal fixation of the fracture, which I don't feel is absolutely necessary versus non-weightbearing for an extended period of time. Will continue with non-weightbearing for now. I did order an endocrinology consultation, but because of financial constraints, he did not want to do that, nor did he want to get any additional blood tests today. I did advise him to intake proper amounts of vitamin D, calcium and a healthy/balanced diet. He will need to be off work for at least 6 weeks. When returning to work, anticipate light duty for another 6 weeks or so.      Follow up in 6 weeks, sooner if experiencing any major setbacks.    Total time spent was 20 minutes; with 20 minutes spent face-to-face with patient dedicated to education, counseling and a development of a treatment plan.    The information in this document, created by a scribe for me, accurately reflects the services I personally performed and the decisions made by me. I have reviewed and approved this document for accuracy.     CHRISTAL Obregon MD  Dept. Orthopedic Surgery  [facility]    "

## 2019-08-08 NOTE — PATIENT INSTRUCTIONS
Continue with limited and protected weightbearing for a total of 6 weeks.  Consider endocrinology appointment.     Take Vitamin D supplements.   Take calcium supplements.  Consider multivitamin.  Work on healthy diet.

## 2019-08-08 NOTE — LETTER
8/8/2019         RE: Fredy Mora  [address] [address]        Dear Colleague,    Thank you for referring your patient, Fredy Mora, to the [facility]. Please see a copy of my visit note below.    SUBJECTIVE:  Fredy Mora had an MRI of the right hip which showed:  1. Significant edema this in the femoral neck, most consistent with a  femoral neck stress fracture. Recommend clinical correlation and  further follow-up with sports medicine/orthopedic surgery and  follow-up MRI.  2. No definite labral tear is noted.    Thus he was sent for a CT scan which is reviewed with the patient showed:  What appears to be a lateral/superior cortex fracture extending into the base of the femoral neck, but not into the medial cortex.  The radiologist felt there was not a fracture.    As a reminder, he started having right hip pain in March that worsened significantly at the end of April. No known injury but notes increasing activity. He reports in addition to all the walking at work, he took up long boarding and was doing walks for exercise that he had not done before; a few weeks after starting these activities he started having pain.He started limited weightbearing for about 4-6 days now. Worsened since onset with now having pain even at rest. Has been using crutches when he can. Uses stairs a lot in his home, making it difficult to use crutches effectively. Has admitted to not using crutches occasionally.    Review of Systems:  Constitutional/General: Negative for fever, chills, change in weight  Integumentary/Skin: Negative for worrisome rashes, moles, or lesions  Neuro: Negative for weakness, dizziness, or paresthesias   Psychiatric: negative for changes in mood or affect    This document serves as a record of the services and decisions personally performed and made by CHRISTAL Obregon MD. It was created on his behalf by Grazyna Su, a trained medical scribe. The creation of  "this document is based the provider's statements to the medical scribe.    Ping Grazyna Su 9:28 AM 8/8/2019          OBJECTIVE:  Physical Exam:  /87 (BP Location: Right arm, Patient Position: Sitting, Cuff Size: Adult Large)   Pulse 131   Ht 1.803 m (5' 11\")   Wt 116.6 kg (257 lb)   SpO2 98%   BMI 35.84 kg/m     General Appearance: healthy, alert and no distress   Skin: no suspicious lesions or rashes  Neuro: Normal strength and tone, mentation intact and speech normal  Vascular: good pulses, and capillary refill   Lymph: no lymphadenopathy   Psych:  mentation appears normal and affect normal/bright  Resp: no increased work of breathing    Exam unchanged from 6/4/2019.    ASSESSMENT:   Encounter Diagnosis   Name Primary?     Stress fracture of neck of femur with routine healing, subsequent encounter Yes        PLAN:  We discussed internal fixation of the fracture, which I don't feel is absolutely necessary versus non-weightbearing for an extended period of time. Will continue with non-weightbearing for now. I did order an endocrinology consultation, but because of financial constraints, he did not want to do that, nor did he want to get any additional blood tests today. I did advise him to intake proper amounts of vitamin D, calcium and a healthy/balanced diet. He will need to be off work for at least 6 weeks. When returning to work, anticipate light duty for another 6 weeks or so.      Follow up in 6 weeks, sooner if experiencing any major setbacks.    Total time spent was 20 minutes; with 20 minutes spent face-to-face with patient dedicated to education, counseling and a development of a treatment plan.    The information in this document, created by a scribe for me, accurately reflects the services I personally performed and the decisions made by me. I have reviewed and approved this document for accuracy.     CHRISTAL Obregon MD  Dept. Orthopedic Surgery  [facility]      Again, thank " you for allowing me to participate in the care of your patient.        Sincerely,        Sidney Obregon MD

## 2019-08-21 ENCOUNTER — MYC MEDICAL ADVICE (OUTPATIENT)
Dept: ORTHOPEDICS | Facility: OTHER | Age: 19
End: 2019-08-21

## 2019-08-22 NOTE — TELEPHONE ENCOUNTER
Form completed for: Fredy Mora  What was done with form: faxed to [phone]. Patient notified.    Jamshid JOHNSON

## 2019-08-27 ENCOUNTER — OFFICE VISIT (OUTPATIENT)
Dept: ORTHOPEDICS | Facility: CLINIC | Age: 19
End: 2019-08-27
Payer: COMMERCIAL

## 2019-08-27 VITALS
DIASTOLIC BLOOD PRESSURE: 83 MMHG | BODY MASS INDEX: 35.98 KG/M2 | WEIGHT: 257 LBS | HEART RATE: 117 BPM | HEIGHT: 71 IN | OXYGEN SATURATION: 95 % | SYSTOLIC BLOOD PRESSURE: 142 MMHG

## 2019-08-27 DIAGNOSIS — M84.353D STRESS FRACTURE OF NECK OF FEMUR WITH ROUTINE HEALING, SUBSEQUENT ENCOUNTER: Primary | ICD-10-CM

## 2019-08-27 PROCEDURE — 99213 OFFICE O/P EST LOW 20 MIN: CPT | Performed by: ORTHOPAEDIC SURGERY

## 2019-08-27 ASSESSMENT — MIFFLIN-ST. JEOR: SCORE: 2197.87

## 2019-08-27 NOTE — LETTER
"    8/27/2019         RE: Fredy Mora  [address] [facility] [address] [facility] [address]        Dear Colleague,    Thank you for referring your patient, Fredy Mora, to the [facility]. Please see a copy of my visit note below.    SUBJECTIVE:  Fredy Mora is here for follow-up.  He is frustrated and still having a lot of pain in the hip.  He has been limiting weightbearing for the past 3 weeks.  He feels that the MRI may have exacerbated his pain.  On the other hand he says that Tylenol takes away the pain and he says that anti-inflammatory took away the pain as well.  What is interesting is that he does not feel that the nonweightbearing has helped his pain.     As a reminder, he started having right hip pain in March that worsened significantly at the end of April. No known injury but notes increasing activity. He reports in addition to all the walking at work, he took up long boarding and was doing walks for exercise that he had not done before; a few weeks after starting these activities he started having pain.He started limited weightbearing for about 4-6 days now. Worsened since onset with now having pain even at rest. Has been using crutches when he can. Uses stairs a lot in his home, making it difficult to use crutches effectively. Has admitted to not using crutches occasionally.     Review of Systems:  Constitutional/General: Negative for fever, chills, change in weight  Integumentary/Skin: Negative for worrisome rashes, moles, or lesions  Neuro: Negative for weakness, dizziness, or paresthesias   Psychiatric: negative for changes in mood or affect       OBJECTIVE:  Physical Exam:  /87 (BP Location: Right arm, Patient Position: Sitting, Cuff Size: Adult Large)   Pulse 131   Ht 1.803 m (5' 11\")   Wt 116.6 kg (257 lb)   SpO2 98%   BMI 35.84 kg/m    General Appearance: healthy, alert and no distress   Skin: no suspicious lesions or rashes  Neuro: Normal strength and tone, " mentation intact and speech normal  Vascular: good pulses, and capillary refill   Lymph: no lymphadenopathy   Psych:  mentation appears normal and affect normal/bright  Resp: no increased work of breathing     Exam unchanged, but doesn't seem to have much pain with rotation of the hip.    Thus he was sent for a CT scan which is reviewed with the patient showed:  What appears to be a lateral/superior cortex fracture extending into the base of the femoral neck, but not into the medial cortex.  The radiologist felt there was not a fracture.         had an MRI of the right hip which showed:  1. Significant edema this in the femoral neck, most consistent with a  femoral neck stress fracture.  2. No definite labral tear is noted.       ASSESSMENT:        Encounter Diagnosis   Name Primary?     Stress fracture of neck of femur with routine healing, subsequent encounter Yes   possible osteoid osteoma     PLAN:  We discussed internal fixation of the fracture, but  I question if this is an osteoid osteoma instead of a stress fracture.  CT shows a small lucent area in the femoral neck on the coronal images corresponding with a notched appearance in the cortex on the sagittal and axial images.  The MRI is interesting as well on second look. I've asked Dr. Villagran at the  to look at the scans to see if this may be an osteoid osteoma.    Will continue with non-weightbearing for now.  I did order an endocrinology consultation, but because of financial constraints, he did not want to do that, nor did he want to get any additional blood tests  I did advise him to intake proper amounts of vitamin D, calcium and a healthy/balanced diet. He will need to be off work for at least 6 weeks. When returning to work, anticipate light duty for another 6 weeks or so.       [facility]     Total time spent was 20 minutes; with 20 minutes spent face-to-face with patient dedicated to education, counseling and a development of  a treatment plan.     The information in this document, created by a scribe for me, accurately reflects the services I personally performed and the decisions made by me. I have reviewed and approved this document for accuracy.      CHRISTAL Obregon MD  Dept. Orthopedic Surgery  [facility]      Again, thank you for allowing me to participate in the care of your patient.        Sincerely,        Sidney Obregon MD

## 2019-08-27 NOTE — PROGRESS NOTES
"SUBJECTIVE:  Fredy Mora is here for follow-up.  He is frustrated and still having a lot of pain in the hip.  He has been limiting weightbearing for the past 3 weeks.  He feels that the MRI may have exacerbated his pain.  On the other hand he says that Tylenol takes away the pain and he says that anti-inflammatory took away the pain as well.  What is interesting is that he does not feel that the nonweightbearing has helped his pain.     As a reminder, he started having right hip pain in March that worsened significantly at the end of April. No known injury but notes increasing activity. He reports in addition to all the walking at work, he took up long boarding and was doing walks for exercise that he had not done before; a few weeks after starting these activities he started having pain.He started limited weightbearing for about 4-6 days now. Worsened since onset with now having pain even at rest. Has been using crutches when he can. Uses stairs a lot in his home, making it difficult to use crutches effectively. Has admitted to not using crutches occasionally.     Review of Systems:  Constitutional/General: Negative for fever, chills, change in weight  Integumentary/Skin: Negative for worrisome rashes, moles, or lesions  Neuro: Negative for weakness, dizziness, or paresthesias   Psychiatric: negative for changes in mood or affect       OBJECTIVE:  Physical Exam:  /87 (BP Location: Right arm, Patient Position: Sitting, Cuff Size: Adult Large)   Pulse 131   Ht 1.803 m (5' 11\")   Wt 116.6 kg (257 lb)   SpO2 98%   BMI 35.84 kg/m    General Appearance: healthy, alert and no distress   Skin: no suspicious lesions or rashes  Neuro: Normal strength and tone, mentation intact and speech normal  Vascular: good pulses, and capillary refill   Lymph: no lymphadenopathy   Psych:  mentation appears normal and affect normal/bright  Resp: no increased work of breathing     Exam unchanged, but doesn't seem to have " much pain with rotation of the hip.    Thus he was sent for a CT scan which is reviewed with the patient showed:  What appears to be a lateral/superior cortex fracture extending into the base of the femoral neck, but not into the medial cortex.  The radiologist felt there was not a fracture.         had an MRI of the right hip which showed:  1. Significant edema this in the femoral neck, most consistent with a  femoral neck stress fracture.  2. No definite labral tear is noted.       ASSESSMENT:        Encounter Diagnosis   Name Primary?     Stress fracture of neck of femur with routine healing, subsequent encounter Yes   possible osteoid osteoma     PLAN:  We discussed internal fixation of the fracture, but  I question if this is an osteoid osteoma instead of a stress fracture.  CT shows a small lucent area in the femoral neck on the coronal images corresponding with a notched appearance in the cortex on the sagittal and axial images.  The MRI is interesting as well on second look. I've asked Dr. Villagran at the  to look at the scans to see if this may be an osteoid osteoma.    Will continue with non-weightbearing for now.  I did order an endocrinology consultation, but because of financial constraints, he did not want to do that, nor did he want to get any additional blood tests  I did advise him to intake proper amounts of vitamin D, calcium and a healthy/balanced diet. He will need to be off work for at least 6 weeks. When returning to work, anticipate light duty for another 6 weeks or so.       [facility]     Total time spent was 20 minutes; with 20 minutes spent face-to-face with patient dedicated to education, counseling and a development of a treatment plan.     The information in this document, created by a scribe for me, accurately reflects the services I personally performed and the decisions made by me. I have reviewed and approved this document for accuracy.      CHRISTAL Obregon  MD  Dept. Orthopedic Surgery  [facility]

## 2019-09-03 ENCOUNTER — TELEPHONE (OUTPATIENT)
Dept: ORTHOPEDICS | Facility: CLINIC | Age: 19
End: 2019-09-03

## 2019-09-03 NOTE — TELEPHONE ENCOUNTER
Reason for Call:  Form, our goal is to have forms completed with 72 hours, however, some forms may require a visit or additional information.    Type of letter, form or note:  handicap    Who is the form from?: Patient    Where did the form come from: Patient or family brought in       What clinic location was the form placed at?: [facility]    Where the form was placed: Given to physician    What number is listed as a contact on the form?: [phone] Fredy       Additional comments: Would like temporary handicap parking for school. This is due to the distance having to park from the school    Call taken on 9/3/2019 at 3:09 PM by Dee Damon

## 2019-09-10 ENCOUNTER — MYC MEDICAL ADVICE (OUTPATIENT)
Dept: ORTHOPEDICS | Facility: CLINIC | Age: 19
End: 2019-09-10

## 2019-09-18 ENCOUNTER — ANCILLARY PROCEDURE (OUTPATIENT)
Dept: GENERAL RADIOLOGY | Facility: CLINIC | Age: 19
End: 2019-09-18
Attending: ORTHOPAEDIC SURGERY
Payer: COMMERCIAL

## 2019-09-18 ENCOUNTER — OFFICE VISIT (OUTPATIENT)
Dept: ORTHOPEDICS | Facility: CLINIC | Age: 19
End: 2019-09-18
Payer: COMMERCIAL

## 2019-09-18 VITALS
HEART RATE: 113 BPM | SYSTOLIC BLOOD PRESSURE: 129 MMHG | HEIGHT: 71 IN | BODY MASS INDEX: 35.98 KG/M2 | WEIGHT: 257 LBS | DIASTOLIC BLOOD PRESSURE: 79 MMHG | OXYGEN SATURATION: 98 %

## 2019-09-18 DIAGNOSIS — G89.29 HIP PAIN, CHRONIC, RIGHT: Primary | ICD-10-CM

## 2019-09-18 DIAGNOSIS — G89.29 HIP PAIN, CHRONIC, RIGHT: ICD-10-CM

## 2019-09-18 DIAGNOSIS — M25.551 HIP PAIN, CHRONIC, RIGHT: Primary | ICD-10-CM

## 2019-09-18 DIAGNOSIS — M25.551 HIP PAIN, CHRONIC, RIGHT: ICD-10-CM

## 2019-09-18 PROCEDURE — 99213 OFFICE O/P EST LOW 20 MIN: CPT | Performed by: ORTHOPAEDIC SURGERY

## 2019-09-18 PROCEDURE — 73502 X-RAY EXAM HIP UNI 2-3 VIEWS: CPT

## 2019-09-18 ASSESSMENT — MIFFLIN-ST. JEOR: SCORE: 2197.87

## 2019-09-18 NOTE — LETTER
"    9/18/2019         RE: Fredy Mora  [address] [address]        Dear Colleague,    Thank you for referring your patient, Fredy Mora, to the [facility]. Please see a copy of my visit note below.    SUBJECTIVE:   Fredy Mora is here in follow up of of his right fermoral neck stress fracture. His 8/5/19 CT scan  showed what appeared to be a lateral/superior cortex fracture extending into the base of the femoral neck, but not into the medial cortex.     MRI showed a stress reaction as well.  Because he has responded so well to Tylenol and low-dose anti-inflammatories, I felt that there was a possibility that this he could have an osteoid osteoma.  Upon starting his CT scan I thought there might be a small lucency in the medial cortex.  Despite 6 weeks of being on crutches he has had no change in his pain.  This would suggest that this is not a stress fracture.  He has not had much change in his symptoms when he does walk and weight-bear.  He reiterates that he did not have rest pain until he had the MRI scan.  The pain was with activities only originally.      Review of Systems:  Constitutional/General: Negative for fever, chills, change in weight  Integumentary/Skin: Negative for worrisome rashes, moles, or lesions  Neuro: Negative for weakness, dizziness, or paresthesias   Psychiatric: negative for changes in mood or affect    OBJECTIVE:  Physical Exam:  /79 (BP Location: Left arm, Patient Position: Sitting, Cuff Size: Adult Regular)   Pulse 113   Ht 1.803 m (5' 11\")   Wt 116.6 kg (257 lb)   SpO2 98%   BMI 35.84 kg/m     General Appearance: healthy, alert and no distress   Skin: no suspicious lesions or rashes  Neuro: Normal strength and tone, mentation intact and speech normal  Vascular: good pulses, and capillary refill   Lymph: no lymphadenopathy   Psych:  mentation appears normal and affect normal/bright  Resp: no increased work of breathing    Right " Hip Exam:  Still has pain with rotation of the hip.    He does have an antalgic gait.    X-rays today show some possibly thickened medial cortex inferiorly of the femoral neck with no fracture line appreciated and no lucency within the cortex around the hip.    ASSESSMENT:   I believe the based on his symptoms that this may be an atypical osteoid osteoma.  I have discussed his case with faculty at the [facility] who felt that may be transient osteoporosis could be a part of the differential diagnosis.     PLAN:    SPECT bone scan was recommended if repeat x-rays were negative.  Which they were.  Thus I ordered the scan.  This would be done at the [facility].      Return to clinic: I will contact him by phone or my chart with the next steps.    CHRISTAL Obregon MD  Dept. Orthopedic Surgery  [facility]         Again, thank you for allowing me to participate in the care of your patient.        Sincerely,        Sidney Obregon MD

## 2019-09-18 NOTE — PROGRESS NOTES
"SUBJECTIVE:   Fredy Mora is here in follow up of of his right fermoral neck stress fracture. His 8/5/19 CT scan  showed what appeared to be a lateral/superior cortex fracture extending into the base of the femoral neck, but not into the medial cortex.    MRI showed a stress reaction as well.  Because he has responded so well to Tylenol and low-dose anti-inflammatories, I felt that there was a possibility that this he could have an osteoid osteoma.  Upon starting his CT scan I thought there might be a small lucency in the medial cortex.  Despite 6 weeks of being on crutches he has had no change in his pain.  This would suggest that this is not a stress fracture.  He has not had much change in his symptoms when he does walk and weight-bear.  He reiterates that he did not have rest pain until he had the MRI scan.  The pain was with activities only originally.      Review of Systems:  Constitutional/General: Negative for fever, chills, change in weight  Integumentary/Skin: Negative for worrisome rashes, moles, or lesions  Neuro: Negative for weakness, dizziness, or paresthesias   Psychiatric: negative for changes in mood or affect    OBJECTIVE:  Physical Exam:  /79 (BP Location: Left arm, Patient Position: Sitting, Cuff Size: Adult Regular)   Pulse 113   Ht 1.803 m (5' 11\")   Wt 116.6 kg (257 lb)   SpO2 98%   BMI 35.84 kg/m    General Appearance: healthy, alert and no distress   Skin: no suspicious lesions or rashes  Neuro: Normal strength and tone, mentation intact and speech normal  Vascular: good pulses, and capillary refill   Lymph: no lymphadenopathy   Psych:  mentation appears normal and affect normal/bright  Resp: no increased work of breathing    Right Hip Exam:  Still has pain with rotation of the hip.    He does have an antalgic gait.    X-rays today show some possibly thickened medial cortex inferiorly of the femoral neck with no fracture line appreciated and no lucency within the cortex " around the hip.    ASSESSMENT:   I believe the based on his symptoms that this may be an atypical osteoid osteoma.  I have discussed his case with faculty at the [facility] who felt that may be transient osteoporosis could be a part of the differential diagnosis.     PLAN:    SPECT bone scan was recommended if repeat x-rays were negative.  Which they were.  Thus I ordered the scan.  This would be done at the [facility].      Return to clinic: I will contact him by phone or my chart with the next steps.    CHRISTAL Obregon MD  Dept. Orthopedic Surgery  [facility]

## 2019-09-20 ENCOUNTER — HOSPITAL ENCOUNTER (OUTPATIENT)
Dept: NUCLEAR MEDICINE | Facility: CLINIC | Age: 19
Setting detail: NUCLEAR MEDICINE
Discharge: HOME | End: 2019-09-20
Attending: ORTHOPAEDIC SURGERY | Admitting: ORTHOPAEDIC SURGERY
Payer: COMMERCIAL

## 2019-09-20 ENCOUNTER — HOSPITAL ENCOUNTER (OUTPATIENT)
Dept: NUCLEAR MEDICINE | Facility: CLINIC | Age: 19
Setting detail: NUCLEAR MEDICINE
End: 2019-09-20
Attending: ORTHOPAEDIC SURGERY
Payer: COMMERCIAL

## 2019-09-20 DIAGNOSIS — G89.29 HIP PAIN, CHRONIC, RIGHT: ICD-10-CM

## 2019-09-20 DIAGNOSIS — M25.551 HIP PAIN, CHRONIC, RIGHT: ICD-10-CM

## 2019-09-20 PROCEDURE — 78315 BONE IMAGING 3 PHASE: CPT

## 2019-09-20 PROCEDURE — A9503 TC99M MEDRONATE: HCPCS | Performed by: ORTHOPAEDIC SURGERY

## 2019-09-20 PROCEDURE — 34300033 ZZH RX 343: Performed by: ORTHOPAEDIC SURGERY

## 2019-09-20 RX ORDER — TC 99M MEDRONATE 20 MG/10ML
20-30 INJECTION, POWDER, LYOPHILIZED, FOR SOLUTION INTRAVENOUS ONCE
Status: COMPLETED | OUTPATIENT
Start: 2019-09-20 | End: 2019-09-20

## 2019-09-20 RX ADMIN — TC 99M MEDRONATE 25.3 MCI.: 20 INJECTION, POWDER, LYOPHILIZED, FOR SOLUTION INTRAVENOUS at 12:07

## 2019-09-24 DIAGNOSIS — D16.21 OSTEOID OSTEOMA OF RIGHT FEMUR: Primary | ICD-10-CM

## 2019-09-25 PROBLEM — M25.551 HIP PAIN, RIGHT: Status: RESOLVED | Noted: 2019-06-12 | Resolved: 2019-09-25

## 2019-09-25 NOTE — TELEPHONE ENCOUNTER
RECORDS RECEIVED FROM: Osteoid osteoma of right femur- referred by Sidney Obregon with  Orthopedics. Imaging and records in [facility].   DATE RECEIVED: Sep 30, 2019   NOTES STATUS DETAILS   OFFICE NOTE from referring provider Internal  Sidney Obregon MD    OFFICE NOTE from other specialist N/A    DISCHARGE SUMMARY from hospital N/A    DISCHARGE REPORT from the ER N/A    OPERATIVE REPORT N/A    MEDICATION LIST Internal    IMPLANT RECORD/STICKER N/A    LABS     CBC/DIFF N/A    CULTURES N/A    INJECTIONS DONE IN RADIOLOGY N/A    MRI Internal    CT SCAN Internal    XRAYS (IMAGES & REPORTS) Internal    TUMOR     PATHOLOGY  Slides & report N/A      09/25/19   12:18 PM   Pre-visit complete  Catherine Glamm, CMA

## 2019-09-30 ENCOUNTER — PRE VISIT (OUTPATIENT)
Dept: ORTHOPEDICS | Facility: CLINIC | Age: 19
End: 2019-09-30

## 2019-09-30 ENCOUNTER — OFFICE VISIT (OUTPATIENT)
Dept: ORTHOPEDICS | Facility: CLINIC | Age: 19
End: 2019-09-30
Attending: ORTHOPAEDIC SURGERY
Payer: COMMERCIAL

## 2019-09-30 VITALS — WEIGHT: 265 LBS | HEIGHT: 71 IN | BODY MASS INDEX: 37.1 KG/M2

## 2019-09-30 DIAGNOSIS — M25.551 PAIN OF RIGHT HIP JOINT: Primary | ICD-10-CM

## 2019-09-30 ASSESSMENT — MIFFLIN-ST. JEOR: SCORE: 2234.16

## 2019-09-30 ASSESSMENT — PAIN SCALES - GENERAL: PAINLEVEL: EXTREME PAIN (8)

## 2019-09-30 NOTE — LETTER
9/30/2019       RE: Fredy Mora  [address] [address]     Dear Colleague,    Thank you for referring your patient, Fredy Mora, to the [facility] at [facility]. Please see a copy of my visit note below.        [facility] Physicians, Orthopaedic Oncology Surgery Consultation  by Gage Villagran M.D.    Fredy Mora MRN# [id number]   Age: 19 year old YOB: 2000     Requesting physician: Lionel Bishop            Assessment and Plan:   Assessment:  19 year old male with a right femoral neck edema most consistent with a stress fracture.  I see no clear convincing evidence of an osteoid osteoma. We will order metabolic screening survey and a dedicated MRI of the right hip without arthrogram for further evaluation.      Plan:    I recommend we evaluate for evidence of abnormal metabolism. Will order a metabolic screening survey and an dedicated MRI of the right hip without arthrogram since the last study was several months ago and fluid sensitive sequences were not possible due to the arthrogram nature of the study.    Counseled patient on smoking cessation.     I recommend he stop all forms of antiinflammatories.              History of Present Illness:   19 year old male with a right  femoral neck stress fracture. Patient reports right hip pain that began in March, 2019 that worsened significantly at the end of April. No known injury but noted increasing activity. He has been following with Dr. Obregon whom he last saw on 9/18/19. His 8/5/19 CT scan showed what appeared to be a lateral/superior cortex fracture extending into the base of the femoral neck, but not into the medial cortex. He was recommended follow-up with me for evaluation of possible osteoid osteoma.     Patient reports he actually had more pain when he was using crutches.  His symptoms have somewhat resolved over the course of the past  "month but he still has discomfort.  He is not using crutches or a cane at the present time.  He denies any limp or list has been walking for long distances. Currently takes Aleve and tylenol for pain, noting particular relief with tylenol. He is concerned about returning to work tomorrow as his job often requires lifting. Patient is also a smoker.          Physical Exam:     EXAMINATION pertinent findings:   VITAL SIGNS: Height 1.803 m (5' 11\"), weight 120.2 kg (265 lb).  Body mass index is 36.96 kg/m .  RESP: non labored breathing   ABD: benign   SKIN: grossly normal   LYMPHATIC: grossly normal   NEURO: grossly normal   VASCULAR: satisfactory perfusion of all extremities   MUSCULOSKELETAL:   Gait is normal.  Hip range of motion is full and symmetric.  Knee examination is normal.           Data:   All laboratory data reviewed  All imaging studies reviewed by me    I carefully reviewed all imaging studies including the SPECT-CT as well as the previous MRI and plain film examinations.  Of note is that I see no area of focal intense activity in the right hip area on the bone scan.  Specks CT bone scan however does demonstrate some area of increased activity in the femoral neck basicervical region.  The previous MRI examination is notable for edema within the inferior portion of the femoral neck.  On the previous CT and plain films and MR imaging I see no evidence of any type of nidus formation.  I do not think there is convincing evidence of the presence of a osteoid osteoma.  I therefore asked for an independent evaluation by 2 of our faculty muscular skeletal radiologist and neither of them see evidence of an osteoid osteoma either.    Attending MD (Dr. Gage Villagran) Attestation :  This patient was seen and evaluated by me including a history, exam, and interpretation of all imaging and/or lab data.  Either a training physician (resident/fellow), who also saw the patient, or scribe has documented the clinic visit in " the attached note.    MD Oksana Figueredo Family Professor  Oncology and Adult Reconstructive Surgery  Dept Orthopaedic Surgery, [facility]  [phone] office, [phone] pager  [email or web address]    Scribe Disclosure:  I, Sumeet Chiang, am serving as a scribe to document services personally performed by Gage Villagran MD at this visit, based upon the provider's statements to me. All documentation has been reviewed by the aforementioned provider prior to being entered into the official medical record.      DATA for DOCUMENTATION:         Past Medical History:     Patient Active Problem List   Diagnosis     Idiopathic thrombocytopenic purpura (H)     Tobacco abuse     Obesity     Moderate single current episode of major depressive disorder (H)     PARMINDER (generalized anxiety disorder)     Past Medical History:   Diagnosis Date     Idiopathic thrombocytopenic purpura (H)      Idiopathic thrombocytopenic purpura (H)      Moderate single current episode of major depressive disorder (H) 9/5/2018     Obesity        Also see scanned health assessment forms.       Past Surgical History:   No past surgical history on file.         Social History:     Social History     Socioeconomic History     Marital status: Single     Spouse name: Not on file     Number of children: Not on file     Years of education: Not on file     Highest education level: Not on file   Occupational History     Not on file   Social Needs     Financial resource strain: Not on file     Food insecurity:     Worry: Not on file     Inability: Not on file     Transportation needs:     Medical: Not on file     Non-medical: Not on file   Tobacco Use     Smoking status: Former Smoker     Packs/day: 0.50     Years: 1.00     Pack years: 0.50     Types: Cigarettes     Smokeless tobacco: Never Used   Substance and Sexual Activity     Alcohol use: Yes     Comment: denies any use recently     Drug use: No     Types: Marijuana     Sexual activity: Never      Partners: Female   Lifestyle     Physical activity:     Days per week: Not on file     Minutes per session: Not on file     Stress: Not on file   Relationships     Social connections:     Talks on phone: Not on file     Gets together: Not on file     Attends Druze service: Not on file     Active member of club or organization: Not on file     Attends meetings of clubs or organizations: Not on file     Relationship status: Not on file     Intimate partner violence:     Fear of current or ex partner: Not on file     Emotionally abused: Not on file     Physically abused: Not on file     Forced sexual activity: Not on file   Other Topics Concern     Parent/sibling w/ CABG, MI or angioplasty before 65F 55M? Not Asked   Social History Narrative     Not on file            Family History:     No known family history of cancer, DVT/PE, bleeding disorders, DM, HTN, HLD, reaction to anesthesia on maternal side. Does not know paternal family history.           Medications:     Current Outpatient Medications   Medication Sig     Ibuprofen (ADVIL PO) Take 400 mg by mouth     nabumetone (RELAFEN) 750 MG tablet 1 tablet twice a day for 10 days and then twice a day only as needed     No current facility-administered medications for this visit.             Review of Systems:   A comprehensive 10 point review of systems (constitutional, ENT, cardiac, peripheral vascular, lymphatic, respiratory, GI, , Musculoskeletal, skin, Neurological) was performed and found to be negative except as described in this note.     See intake form completed by patient    Again, thank you for allowing me to participate in the care of your patient.      Sincerely,    Gage Villagran MD

## 2019-09-30 NOTE — NURSING NOTE
"Reason For Visit:   Chief Complaint   Patient presents with     Right Hip - Pain     Consult     right hip pain x 6 months       Ht 1.803 m (5' 11\")   Wt 120.2 kg (265 lb)   BMI 36.96 kg/m           Mitchell Sellers CMA    "

## 2019-09-30 NOTE — PROGRESS NOTES
[facility], Orthopaedic Oncology Surgery Consultation  by Gage Villagran M.D.    Fredy Mora MRN# [id number]   Age: 19 year old YOB: 2000     Requesting physician: Lionel Bishop            Assessment and Plan:   Assessment:  19 year old male with a right femoral neck edema most consistent with a stress fracture.  I see no clear convincing evidence of an osteoid osteoma. We will order metabolic screening survey and a dedicated MRI of the right hip without arthrogram for further evaluation.      Plan:    I recommend we evaluate for evidence of abnormal metabolism. Will order a metabolic screening survey and an dedicated MRI of the right hip without arthrogram since the last study was several months ago and fluid sensitive sequences were not possible due to the arthrogram nature of the study.    Counseled patient on smoking cessation.     I recommend he stop all forms of antiinflammatories.              History of Present Illness:   19 year old male with a right femoral neck stress fracture. Patient reports right hip pain that began in March, 2019 that worsened significantly at the end of April. No known injury but noted increasing activity. He has been following with Dr. Obreogn whom he last saw on 9/18/19. His 8/5/19 CT scan showed what appeared to be a lateral/superior cortex fracture extending into the base of the femoral neck, but not into the medial cortex. He was recommended follow-up with me for evaluation of possible osteoid osteoma.     Patient reports he actually had more pain when he was using crutches.  His symptoms have somewhat resolved over the course of the past month but he still has discomfort.  He is not using crutches or a cane at the present time.  He denies any limp or list has been walking for long distances. Currently takes Aleve and tylenol for pain, noting particular relief with tylenol. He is concerned about returning to work tomorrow as  "his job often requires lifting. Patient is also a smoker.              Physical Exam:     EXAMINATION pertinent findings:   VITAL SIGNS: Height 1.803 m (5' 11\"), weight 120.2 kg (265 lb).  Body mass index is 36.96 kg/m .  RESP: non labored breathing   ABD: benign   SKIN: grossly normal   LYMPHATIC: grossly normal   NEURO: grossly normal   VASCULAR: satisfactory perfusion of all extremities   MUSCULOSKELETAL:   Gait is normal.  Hip range of motion is full and symmetric.  Knee examination is normal.           Data:   All laboratory data reviewed  All imaging studies reviewed by me    I carefully reviewed all imaging studies including the SPECT-CT as well as the previous MRI and plain film examinations.  Of note is that I see no area of focal intense activity in the right hip area on the bone scan.  Specks CT bone scan however does demonstrate some area of increased activity in the femoral neck basicervical region.  The previous MRI examination is notable for edema within the inferior portion of the femoral neck.  On the previous CT and plain films and MR imaging I see no evidence of any type of nidus formation.  I do not think there is convincing evidence of the presence of a osteoid osteoma.  I therefore asked for an independent evaluation by 2 of our faculty muscular skeletal radiologist and neither of them see evidence of an osteoid osteoma either.    Attending MD (Dr. Gage Villagran) Attestation :  This patient was seen and evaluated by me including a history, exam, and interpretation of all imaging and/or lab data.  Either a training physician (resident/fellow), who also saw the patient, or scribe has documented the clinic visit in the attached note.    Gage Villagran MD  [facility] Family Professor  Oncology and Adult Reconstructive Surgery  Dept Orthopaedic Surgery, [facility]  [phone] office, [phone] pager  [email or web address]    Scribe Disclosure:  I, Sumeet Chiang, am serving as a scribe to document " services personally performed by Gage Villagran MD at this visit, based upon the provider's statements to me. All documentation has been reviewed by the aforementioned provider prior to being entered into the official medical record.      DATA for DOCUMENTATION:         Past Medical History:     Patient Active Problem List   Diagnosis     Idiopathic thrombocytopenic purpura (H)     Tobacco abuse     Obesity     Moderate single current episode of major depressive disorder (H)     PARMINDER (generalized anxiety disorder)     Past Medical History:   Diagnosis Date     Idiopathic thrombocytopenic purpura (H)      Idiopathic thrombocytopenic purpura (H)      Moderate single current episode of major depressive disorder (H) 9/5/2018     Obesity        Also see scanned health assessment forms.       Past Surgical History:   No past surgical history on file.         Social History:     Social History     Socioeconomic History     Marital status: Single     Spouse name: Not on file     Number of children: Not on file     Years of education: Not on file     Highest education level: Not on file   Occupational History     Not on file   Social Needs     Financial resource strain: Not on file     Food insecurity:     Worry: Not on file     Inability: Not on file     Transportation needs:     Medical: Not on file     Non-medical: Not on file   Tobacco Use     Smoking status: Former Smoker     Packs/day: 0.50     Years: 1.00     Pack years: 0.50     Types: Cigarettes     Smokeless tobacco: Never Used   Substance and Sexual Activity     Alcohol use: Yes     Comment: denies any use recently     Drug use: No     Types: Marijuana     Sexual activity: Never     Partners: Female   Lifestyle     Physical activity:     Days per week: Not on file     Minutes per session: Not on file     Stress: Not on file   Relationships     Social connections:     Talks on phone: Not on file     Gets together: Not on file     Attends [employer] service: Not on  file     Active member of club or organization: Not on file     Attends meetings of clubs or organizations: Not on file     Relationship status: Not on file     Intimate partner violence:     Fear of current or ex partner: Not on file     Emotionally abused: Not on file     Physically abused: Not on file     Forced sexual activity: Not on file   Other Topics Concern     Parent/sibling w/ CABG, MI or angioplasty before 65F 55M? Not Asked   Social History Narrative     Not on file            Family History:     No known family history of cancer, DVT/PE, bleeding disorders, DM, HTN, HLD, reaction to anesthesia on maternal side. Does not know paternal family history.           Medications:     Current Outpatient Medications   Medication Sig     Ibuprofen (ADVIL PO) Take 400 mg by mouth     nabumetone (RELAFEN) 750 MG tablet 1 tablet twice a day for 10 days and then twice a day only as needed     No current facility-administered medications for this visit.               Review of Systems:   A comprehensive 10 point review of systems (constitutional, ENT, cardiac, peripheral vascular, lymphatic, respiratory, GI, , Musculoskeletal, skin, Neurological) was performed and found to be negative except as described in this note.     See intake form completed by patient

## 2019-10-28 DIAGNOSIS — M25.551 PAIN OF RIGHT HIP JOINT: ICD-10-CM

## 2019-10-28 LAB
ALP SERPL-CCNC: 113 U/L (ref 40–150)
BUN SERPL-MCNC: 17 MG/DL (ref 7–30)
CALCIUM SERPL-MCNC: 10.3 MG/DL (ref 8.5–10.1)
CREAT SERPL-MCNC: 0.82 MG/DL (ref 0.66–1.25)
GFR SERPL CREATININE-BSD FRML MDRD: >90 ML/MIN/{1.73_M2}
PHOSPHATE SERPL-MCNC: 3.7 MG/DL (ref 2.5–4.5)
PTH-INTACT SERPL-MCNC: 19 PG/ML (ref 18–80)

## 2019-10-28 PROCEDURE — 36415 COLL VENOUS BLD VENIPUNCTURE: CPT | Performed by: ORTHOPAEDIC SURGERY

## 2019-10-28 PROCEDURE — 82310 ASSAY OF CALCIUM: CPT | Performed by: ORTHOPAEDIC SURGERY

## 2019-10-28 PROCEDURE — 83970 ASSAY OF PARATHORMONE: CPT | Performed by: ORTHOPAEDIC SURGERY

## 2019-10-28 PROCEDURE — 84100 ASSAY OF PHOSPHORUS: CPT | Performed by: ORTHOPAEDIC SURGERY

## 2019-10-28 PROCEDURE — 84520 ASSAY OF UREA NITROGEN: CPT | Performed by: ORTHOPAEDIC SURGERY

## 2019-10-28 PROCEDURE — 82306 VITAMIN D 25 HYDROXY: CPT | Performed by: ORTHOPAEDIC SURGERY

## 2019-10-28 PROCEDURE — 82565 ASSAY OF CREATININE: CPT | Performed by: ORTHOPAEDIC SURGERY

## 2019-10-28 PROCEDURE — 84075 ASSAY ALKALINE PHOSPHATASE: CPT | Performed by: ORTHOPAEDIC SURGERY

## 2019-10-29 LAB — DEPRECATED CALCIDIOL+CALCIFEROL SERPL-MC: 27 UG/L (ref 20–75)

## 2019-10-31 ENCOUNTER — ANCILLARY PROCEDURE (OUTPATIENT)
Dept: MRI IMAGING | Facility: CLINIC | Age: 19
End: 2019-10-31
Attending: ORTHOPAEDIC SURGERY
Payer: COMMERCIAL

## 2019-10-31 DIAGNOSIS — M25.551 PAIN OF RIGHT HIP JOINT: ICD-10-CM

## 2019-10-31 RX ORDER — GADOBUTROL 604.72 MG/ML
10 INJECTION INTRAVENOUS ONCE
Status: COMPLETED | OUTPATIENT
Start: 2019-10-31 | End: 2019-10-31

## 2019-10-31 RX ADMIN — GADOBUTROL 10 ML: 604.72 INJECTION INTRAVENOUS at 19:25

## 2019-11-01 NOTE — DISCHARGE INSTRUCTIONS
MRI Contrast Discharge Instructions    The IV contrast you received today will pass out of your body in your  urine. This will happen in the next 24 hours. You will not feel this process.  Your urine will not change color.    Drink at least 4 extra glasses of water or juice today (unless your doctor  has restricted your fluids). This reduces the stress on your kidneys.  You may take your regular medicines.    If you are on dialysis: It is best to have dialysis today.    If you have a reaction: Most reactions happen right away. If you have  any new symptoms after leaving the hospital (such as hives or swelling),  call your hospital at the correct number below. Or call your family doctor.  If you have breathing distress or wheezing, call 911.    Special instructions: ***    I have read and understand the above information.    Signature:______________________________________ Date:___________    Staff:__________________________________________ Date:___________     Time:__________    [facility] Departments:    ___[place]: [phone]  ___[facility]: [phone]  ___[place]: [phone] ___[facility]: [phone]  ___[facility]: [phone]  ___[facility]: [phone]  ___[phone]  ___[facility]: [phone]  ___[phone]

## 2019-11-04 ENCOUNTER — HEALTH MAINTENANCE LETTER (OUTPATIENT)
Age: 19
End: 2019-11-04

## 2019-11-21 NOTE — RESULT ENCOUNTER NOTE
Fredy, the vitamin D and laboratory testing indicate that your calcium is low but not abnormally so in terms of actually being deficient of vitamin D or having parathyroid disease.  Nonetheless I think taking calcium and vitamin supplementation would be worthwhile.  Respect to the MRI examination, it still shows evidence of a stress fracture within the right femoral neck and hip region.  I think we should do a follow-up examination in 4 to 6 months with another MRI study.    Gage Villagran MD  11/21/2019  12:41 PM

## 2020-03-09 DIAGNOSIS — M25.551 PAIN OF RIGHT HIP JOINT: Primary | ICD-10-CM

## 2020-03-12 ENCOUNTER — ANCILLARY PROCEDURE (OUTPATIENT)
Dept: GENERAL RADIOLOGY | Facility: CLINIC | Age: 20
End: 2020-03-12
Attending: ORTHOPAEDIC SURGERY
Payer: COMMERCIAL

## 2020-03-12 ENCOUNTER — OFFICE VISIT (OUTPATIENT)
Dept: ORTHOPEDICS | Facility: CLINIC | Age: 20
End: 2020-03-12
Payer: COMMERCIAL

## 2020-03-12 VITALS — BODY MASS INDEX: 36.99 KG/M2 | WEIGHT: 264.2 LBS | HEIGHT: 71 IN

## 2020-03-12 DIAGNOSIS — M25.551 PAIN OF RIGHT HIP JOINT: ICD-10-CM

## 2020-03-12 DIAGNOSIS — M25.551 PAIN OF RIGHT HIP JOINT: Primary | ICD-10-CM

## 2020-03-12 ASSESSMENT — MIFFLIN-ST. JEOR: SCORE: 2230.53

## 2020-03-12 ASSESSMENT — PATIENT HEALTH QUESTIONNAIRE - PHQ9
10. IF YOU CHECKED OFF ANY PROBLEMS, HOW DIFFICULT HAVE THESE PROBLEMS MADE IT FOR YOU TO DO YOUR WORK, TAKE CARE OF THINGS AT HOME, OR GET ALONG WITH OTHER PEOPLE: NOT DIFFICULT AT ALL
SUM OF ALL RESPONSES TO PHQ QUESTIONS 1-9: 2
SUM OF ALL RESPONSES TO PHQ QUESTIONS 1-9: 2

## 2020-03-12 ASSESSMENT — HOOS S4: HOW SEVERE IS YOUR HIP JOINT STIFFNESS AFTER FIRST WAKENING IN THE MORNING?: MODERATE

## 2020-03-12 ASSESSMENT — ACTIVITIES OF DAILY LIVING (ADL)
ADL_SUM: 7
ADL_SUBSCALE_SCORE: 89.7
ADL_MEAN: .41

## 2020-03-12 NOTE — LETTER
"3/12/2020      RE: Fredy Mora  [address] [facility] [address]           [facility]  Orthopaedic Surgery, Joint Replacement Consultation  by Gage Villagran M.D.    Fredy Mora MRN# [id number]   Age: 19 year old YOB: 2000     Requesting physician: Referred Self  Lionel Sandhu     Background History:  Diagnosis:  1. Right femoral neck stress fracture     Treatments:  1. None           History of Present Illness:   Fredy Mora is a 19 year old male who presents today for follow-up of a right femoral neck stress fracture. He was last seen here on 9/30/2019, at which time he was continuing to have pain in the right hip which has now been going on for about a year. MRI of the hip was ordered, but this has not yet been completed. He did have metabolic screening labs performed. Today, the patient reports no change or improvement in symptoms. He says that the pain is well-controlled with Aleve, but if he does not take this he has increased pain that causes him to limp. The pain is constant and worse when he wakes up in the morning. He has been able to work and go to the gym with the use of the Aleve, and exercise does not exacerbate the pain. He typically takes one Aleve daily around 3PM before he goes to work. Patient did stop smoking for a while, but has since resumed this.            Physical Exam:     EXAMINATION pertinent findings:   PSYCH: Pleasant, healthy-appearing, alert, oriented x3, cooperative. Normal mood and affect.  VITAL SIGNS: Height 1.803 m (5' 11\"), weight 119.8 kg (264 lb 3.2 oz)..  Reviewed nursing intake notes.   Body mass index is 36.85 kg/m .  RESP: non labored breathing   SKIN: grossly normal   NEURO: grossly normal , no motor deficits  VASCULAR: satisfactory perfusion of all extremities   MUSCULOSKELETAL: Normal gait.          Data:   All laboratory data reviewed  All imaging studies reviewed by me    XR Pelvis and Hip Right 1 Vw (03/12/20):  No clear " evidence of osteoid osteoma or nidus. No significant change from prior x-rays. No other pathology visualized.      NM Bone Scan Whole Body 3 Phase (9/20/2019):  Findings suggestive of an osteoid osteoma of the right  femoral neck. Superimposed sequela of stress fracture which was  suggested on prior MRI cannot be excluded. Per radiology.     MR Hip Right with/without Contrast (10/31/2019):  Bone marrow edema of the femoral neck without substantial change  compared to prior MRI 8/1/2019. This bone marrow edema pattern is most  compatible with persistent stress related change. Consider continued  follow-up to resolution. Per radiology.     Recent Labs   Lab Test 02/15/19  1000 09/04/18  0947 08/14/18  1714   HGB 16.9 16.3 15.1   WBC 6.5 5.5 7.8     Labs performed 10/28/2019:  Alk Phos:   Calcium: 10.3 (high)  Creatinine: WNL 0.82  Phosphorus: WNL 3.7  Parathyroid Hormone: WNL 19  Urea Nitrogen: WNL 17  Vitamin D: WNL 27         Assessment and Plan:   Assessment:  History highly suggestive of osteoid osteoma, but cannot identify nidus on CT scan.     Plan:    X-ray today of the hip.    MRI as scheduled on 3/22/2020. I will send him a message on [employer] regarding the results.     If MRI does not identify an osteoid osteoma, we will do a thin cut CT scan with coronal and sagittal reconstructions.       Attending MD (Dr. Gage Villagran) Attestation :  This patient was seen and evaluated by me including a history, exam, and interpretation of all imaging and/or lab data.  Either a training physician (resident/fellow), who also saw the patient, or scribe has documented the clinic visit in the attached note.    MD Oksana Figueredo Family Professor  Oncology and Adult Reconstructive Surgery  Dept Orthopaedic Surgery, [facility]  [phone] office, [phone] pager  [email or web address]    Total Time = 25 min, 50% of which was spent in counseling and coordination of care as documented above.             Review of Systems:   A comprehensive 10 point review of systems (constitutional, ENT, cardiac, peripheral vascular, lymphatic, respiratory, GI, , Musculoskeletal, skin, Neurological) was performed and found to be negative except as described in this note.     See intake form completed by patient       Scribe Disclosure:  I, Jona Garnettd, am serving as a scribe to document services personally performed by Gage Villagran MD at this visit, based upon the provider's statements to me. All documentation has been reviewed by the aforementioned provider prior to being entered into the official medical record.   Answers for HPI/ROS submitted by the patient on 3/12/2020   If you checked off any problems, how difficult have these problems made it for you to do your work, take care of things at home, or get along with other people?: Not difficult at all  PHQ9 TOTAL SCORE: 2  General Symptoms: No  Skin Symptoms: No  HENT Symptoms: No  EYE SYMPTOMS: No  HEART SYMPTOMS: No  LUNG SYMPTOMS: No  INTESTINAL SYMPTOMS: No  URINARY SYMPTOMS: No  REPRODUCTIVE SYMPTOMS: No  SKELETAL SYMPTOMS: No  BLOOD SYMPTOMS: No  NERVOUS SYSTEM SYMPTOMS: No  MENTAL HEALTH SYMPTOMS: No      Gage Villagran MD

## 2020-03-12 NOTE — NURSING NOTE
"Chief Complaint   Patient presents with     RECHECK     F/u Continued Right Hip Pain. Pt. reports the pain has stayed the same.       19 year old  2000    Ht 1.803 m (5' 11\")   Wt 119.8 kg (264 lb 3.2 oz)   BMI 36.85 kg/m          [facility]  [place] [place] [facility] [address]    No Known Allergies    Current Outpatient Medications   Medication     Ibuprofen (ADVIL PO)     Naproxen Sodium (ALEVE PO)     nabumetone (RELAFEN) 750 MG tablet     No current facility-administered medications for this visit.            Questionnaires:    HOOS Hip Dysfunction & Osteoarthritis Outcome Questionnaire    Hip Dysfunction & Osteoarthritis Outcome Score (HOOS), English Version LK 2.0 (Yasir JONES, Patricia BENZ, Dottie COLLIER, 2003) 3/12/2020   S1. Do you feel grinding, hear clicking or any other type of noise from your hip? Never   S2. Difficulties spreading legs wide apart None   S3. Difficulties to stride out when walking Mild   S4. How severe is your hip joint stiffness after first wakening in the morning? Moderate   S5. How severe is your hip stiffness after sitting, lying or resting LATER IN THE DAY? Moderate   Symptom Count 5   Symptom Sum 5   Symptom Mean 1   Symptom Subscale Score 75   P1. How often is your hip painful? Daily   P2. Straightening your hip fully None   P3. Bending your hip FULLY None   P4. Walking on a flat surface None   P5. Going up or down stairs None   P6. At night while in bed Moderate   P7. Sitting or lying Mild   P8. Standing upright Moderate   P9. Walking on a hard surface (asphalt, concrete, etc.) Mild   P10. Walking on an uneven surface Mild   Pain Count 10   Pain Sum 10   Pain Mean 1   Pain Subscale Score 75   A1. Descending stairs None   A2. Ascending stairs None   A3. Rising from sitting None   A4. Standing Mild   A5. Bending to the floor/ an object None   A6. Walking on a flat surface None   A7. Getting in/out of car None   A8. Going shopping None   A9. Putting on " socks/stockings None   A10. Rising from bed Mild   A11. Taking off socks/stockings None   A12. Lying in bed (turning over, maintaining hip position) Mild   A13. Getting in/out of bed Mild   A14. Sitting Mild   A15. Getting on/off toilet Mild   A16. Heavy domestic duties (moving heavy boxes, scrubbing floors, etc.) Mild   A17. Light domestic duties (cooking, dusting, etc.) None   ADL Count 17   ADL Sum 7   ADL Mean 0.41   ADL Subscale Score 89.7   SP1. Squatting Mild   SP2. Running Mild   SP3. Twisting/pivoting on loaded leg Moderate   SP4. Walking on uneven surface Mild   Sports/Rec Count 4   Sports/Rec Sum 5   Sports/Rec Mean 1.25   Sports/Rec Subscale Score 68.75   Q1. How often are you aware of your hip problem? Constantly   Q2. Have you modified you life style to avoid activities potentially damaging to your hip? Moderately   Q3. How much are you troubled with lack of confidence in your hip? Mildly   Q4. In general, how much difficulty do you have with your hip? Moderate   QOL Count 4   QOL Sum 9   QOL Mean 2.25   Quality of Life Subscale Score 43.75                  Promis 10 Assessment    PROMIS 10 3/12/2020   In general, would you say your health is: Good   In general, would you say your quality of life is: Fair   In general, how would you rate your physical health? Good   In general, how would you rate your mental health, including your mood and your ability to think? Good   In general, how would you rate your satisfaction with your social activities and relationships? Good   In general, please rate how well you carry out your usual social activities and roles Good   To what extent are you able to carry out your everyday physical activities such as walking, climbing stairs, carrying groceries, or moving a chair? Completely   How often have you been bothered by emotional problems such as feeling anxious, depressed or irritable? Sometimes   How would you rate your fatigue on average? Mild   How would you rate  your pain on average?   0 = No Pain  to  10 = Worst Imaginable Pain 8   In general, would you say your health is: 3   In general, would you say your quality of life is: 2   In general, how would you rate your physical health? 3   In general, how would you rate your mental health, including your mood and your ability to think? 3   In general, how would you rate your satisfaction with your social activities and relationships? 3   In general, please rate how well you carry out your usual social activities and roles. (This includes activities at home, at work and in your community, and responsibilities as a parent, child, spouse, employee, friend, etc.) 3   To what extent are you able to carry out your everyday physical activities such as walking, climbing stairs, carrying groceries, or moving a chair? 5   In the past 7 days, how often have you been bothered by emotional problems such as feeling anxious, depressed, or irritable? 3   In the past 7 days, how would you rate your fatigue on average? 2   In the past 7 days, how would you rate your pain on average, where 0 means no pain, and 10 means worst imaginable pain? 8   Global Mental Health Score 11   Global Physical Health Score 14   PROMIS TOTAL - SUBSCORES 25   Some recent data might be hidden

## 2020-03-12 NOTE — LETTER
3/12/2020       RE: Fredy Mora  [address] [address]     Dear Colleague,    Thank you for referring your patient, Fredy Mora, to the [facility] at [facility]. Please see a copy of my visit note below.        [facility]  Orthopaedic Surgery, Joint Replacement Consultation  by Gage Villagran M.D.    Fredy Mora MRN# [id number]   Age: 19 year old YOB: 2000     Requesting physician: Referred Self  Lionel Sandhu     Background History:  Diagnosis:  1. Right femoral neck stress fracture     Treatments:  ***          Assessment and Plan:   Assessment:  ***     Plan:  ***           History of Present Illness:   19 year old male  chief complaint    ***          Physical Exam:     EXAMINATION pertinent findings:   PSYCH: Pleasant, healthy-appearing, alert, oriented x3, cooperative. Normal mood and affect.  VITAL SIGNS: There were no vitals taken for this visit..  Reviewed nursing intake notes.   There is no height or weight on file to calculate BMI.  RESP: non labored breathing   ABD: benign, soft, non-tender, no acute peritoneal findings  SKIN: grossly normal   LYMPHATIC: grossly normal, no adenopathy, no extremity edema  NEURO: grossly normal , no motor deficits  VASCULAR: satisfactory perfusion of all extremities   MUSCULOSKELETAL:   ***                  Data:   All laboratory data reviewed  All imaging studies reviewed by ginny RAMOS Bone Scan Whole Body 3 Phase (9/20/2019):  Findings suggestive of an osteoid osteoma of the right  femoral neck. Superimposed sequela of stress fracture which was  suggested on prior MRI cannot be excluded. Per radiology.     MR Hip Right with/without Contrast (10/31/2019):  Bone marrow edema of the femoral neck without substantial change  compared to prior MRI 8/1/2019. This bone marrow edema pattern is most  compatible with persistent stress related change. Consider  continued  follow-up to resolution. Per radiology.       Attending MD (Dr. Gage Villagran) Attestation :  This patient was seen and evaluated by me including a history, exam, and interpretation of all imaging and/or lab data.  Either a training physician (resident/fellow), who also saw the patient, or scribe has documented the clinic visit in the attached note.    Gage Villagran MD  [facility]  Oncology and Adult Reconstructive Surgery  Dept Orthopaedic Surgery, [facility]  [phone] office, [phone] pager  [email or web address]           Review of Systems:   A comprehensive 10 point review of systems (constitutional, ENT, cardiac, peripheral vascular, lymphatic, respiratory, GI, , Musculoskeletal, skin, Neurological) was performed and found to be negative except as described in this note.     See intake form completed by patient       Scribe Disclosure:  Jona ARAYA, am serving as a scribe to document services personally performed by Gage Villagran MD at this visit, based upon the provider's statements to me. All documentation has been reviewed by the aforementioned provider prior to being entered into the official medical record. ***    Jona ARAYA, a scribe, prepared the chart for today's encounter.      Again, thank you for allowing me to participate in the care of your patient.      Sincerely,    Gage Villagran MD

## 2020-03-12 NOTE — PROGRESS NOTES
"    [facility]  Orthopaedic Surgery, Joint Replacement Consultation  by Gage Villagran M.D.    Fredy Mora MRN# [id number]   Age: 19 year old YOB: 2000     Requesting physician: Referred Self  Lionel Sandhu     Background History:  Diagnosis:  1. Right femoral neck stress fracture     Treatments:  1. None           History of Present Illness:   Fredy Mora is a 19 year old male who presents today for follow-up of a right femoral neck stress fracture. He was last seen here on 9/30/2019, at which time he was continuing to have pain in the right hip which has now been going on for about a year. MRI of the hip was ordered, but this has not yet been completed. He did have metabolic screening labs performed. Today, the patient reports no change or improvement in symptoms. He says that the pain is well-controlled with Aleve, but if he does not take this he has increased pain that causes him to limp. The pain is constant and worse when he wakes up in the morning. He has been able to work and go to the gym with the use of the Aleve, and exercise does not exacerbate the pain. He typically takes one Aleve daily around 3PM before he goes to work. Patient did stop smoking for a while, but has since resumed this.            Physical Exam:     EXAMINATION pertinent findings:   PSYCH: Pleasant, healthy-appearing, alert, oriented x3, cooperative. Normal mood and affect.  VITAL SIGNS: Height 1.803 m (5' 11\"), weight 119.8 kg (264 lb 3.2 oz)..  Reviewed nursing intake notes.   Body mass index is 36.85 kg/m .  RESP: non labored breathing   SKIN: grossly normal   NEURO: grossly normal , no motor deficits  VASCULAR: satisfactory perfusion of all extremities   MUSCULOSKELETAL: Normal gait.          Data:   All laboratory data reviewed  All imaging studies reviewed by me    XR Pelvis and Hip Right 1 Vw (03/12/20):  No clear evidence of osteoid osteoma or nidus. No significant change from prior x-rays. No " other pathology visualized.      NM Bone Scan Whole Body 3 Phase (9/20/2019):  Findings suggestive of an osteoid osteoma of the right  femoral neck. Superimposed sequela of stress fracture which was  suggested on prior MRI cannot be excluded. Per radiology.     MR Hip Right with/without Contrast (10/31/2019):  Bone marrow edema of the femoral neck without substantial change  compared to prior MRI 8/1/2019. This bone marrow edema pattern is most  compatible with persistent stress related change. Consider continued  follow-up to resolution. Per radiology.     Recent Labs   Lab Test 02/15/19  1000 09/04/18  0947 08/14/18  1714   HGB 16.9 16.3 15.1   WBC 6.5 5.5 7.8     Labs performed 10/28/2019:  Alk Phos:   Calcium: 10.3 (high)  Creatinine: WNL 0.82  Phosphorus: WNL 3.7  Parathyroid Hormone: WNL 19  Urea Nitrogen: WNL 17  Vitamin D: WNL 27         Assessment and Plan:   Assessment:  History highly suggestive of osteoid osteoma, but cannot identify nidus on CT scan.     Plan:    X-ray today of the hip.    MRI as scheduled on 3/22/2020. I will send him a message on [employer] regarding the results.     If MRI does not identify an osteoid osteoma, we will do a thin cut CT scan with coronal and sagittal reconstructions.       Attending MD (Dr. Gage Villagran) Attestation :  This patient was seen and evaluated by me including a history, exam, and interpretation of all imaging and/or lab data.  Either a training physician (resident/fellow), who also saw the patient, or scribe has documented the clinic visit in the attached note.    Gage Villagran MD  [employer]  Oncology and Adult Reconstructive Surgery  Dept Orthopaedic Surgery, [facility]  [phone] office, [phone] pager  [email or web address]    Total Time = 25 min, 50% of which was spent in counseling and coordination of care as documented above.            Review of Systems:   A comprehensive 10 point review of systems (constitutional, ENT,  cardiac, peripheral vascular, lymphatic, respiratory, GI, , Musculoskeletal, skin, Neurological) was performed and found to be negative except as described in this note.     See intake form completed by patient       Scribe Disclosure:  I, Jona Vazquez, am serving as a scribe to document services personally performed by Gage Villagran MD at this visit, based upon the provider's statements to me. All documentation has been reviewed by the aforementioned provider prior to being entered into the official medical record.   Answers for HPI/ROS submitted by the patient on 3/12/2020   If you checked off any problems, how difficult have these problems made it for you to do your work, take care of things at home, or get along with other people?: Not difficult at all  PHQ9 TOTAL SCORE: 2  General Symptoms: No  Skin Symptoms: No  HENT Symptoms: No  EYE SYMPTOMS: No  HEART SYMPTOMS: No  LUNG SYMPTOMS: No  INTESTINAL SYMPTOMS: No  URINARY SYMPTOMS: No  REPRODUCTIVE SYMPTOMS: No  SKELETAL SYMPTOMS: No  BLOOD SYMPTOMS: No  NERVOUS SYSTEM SYMPTOMS: No  MENTAL HEALTH SYMPTOMS: No

## 2020-06-09 ENCOUNTER — E-VISIT (OUTPATIENT)
Dept: FAMILY MEDICINE | Facility: CLINIC | Age: 20
End: 2020-06-09
Payer: COMMERCIAL

## 2020-06-09 DIAGNOSIS — R19.7 DIARRHEA, UNSPECIFIED TYPE: Primary | ICD-10-CM

## 2020-06-09 PROCEDURE — 99421 OL DIG E/M SVC 5-10 MIN: CPT | Performed by: FAMILY MEDICINE

## 2020-06-10 ENCOUNTER — VIRTUAL VISIT (OUTPATIENT)
Dept: FAMILY MEDICINE | Facility: OTHER | Age: 20
End: 2020-06-10

## 2020-06-10 DIAGNOSIS — U07.1 COVID-19 VIRUS INFECTION: Primary | ICD-10-CM

## 2020-06-10 PROCEDURE — U0003 INFECTIOUS AGENT DETECTION BY NUCLEIC ACID (DNA OR RNA); SEVERE ACUTE RESPIRATORY SYNDROME CORONAVIRUS 2 (SARS-COV-2) (CORONAVIRUS DISEASE [COVID-19]), AMPLIFIED PROBE TECHNIQUE, MAKING USE OF HIGH THROUGHPUT TECHNOLOGIES AS DESCRIBED BY CMS-2020-01-R: HCPCS | Mod: 90 | Performed by: FAMILY MEDICINE

## 2020-06-10 PROCEDURE — 99000 SPECIMEN HANDLING OFFICE-LAB: CPT | Performed by: FAMILY MEDICINE

## 2020-06-10 NOTE — PROGRESS NOTES
"Date: 06/10/2020 10:53:23  Clinician: Ludy Block  Clinician NPI: [id number]  Patient: Fredy Mora  Patient : 2000  Patient Address: [address] [place] [address]  Patient Phone: [phone]  Visit Protocol: URI  Patient Summary:  Fredy is a 19 year old ( : 2000 ) male who initiated a Visit for COVID-19 (Coronavirus) evaluation and screening. When asked the question \"Please sign me up to receive news, health information and promotions from [employer].\", Fredy responded \"No\".    Fredy states his symptoms started gradually 3-4 days ago.   His symptoms consist of diarrhea, malaise, and myalgia. Fredy also feels feverish.   Symptom details   Temperature: His current temperature is 99.4 degrees Fahrenheit.    Fredy denies having wheezing, nausea, teeth pain, ageusia, sore throat, anosmia, facial pain or pressure, cough, nasal congestion, vomiting, rhinitis, ear pain, headache, and chills. He also denies having recent facial or sinus surgery in the past 60 days, double sickening (worsening symptoms after initial improvement), and taking antibiotic medication for the symptoms. He is not experiencing dyspnea.   Precipitating events  He has not recently been exposed to someone with influenza. Fredy has been in close contact with the following high risk individuals: people with asthma, heart disease or diabetes.   Pertinent COVID-19 (Coronavirus) information  In the past 14 days, Fredy has not worked in a congregate living setting.   He does not work or volunteer as healthcare worker or a  and does not work or volunteer in a healthcare facility.   Fredy also has not lived in a congregate living setting in the past 14 days. He does not live with a healthcare worker.   Fredy has not had a close contact with a laboratory-confirmed COVID-19 patient within 14 days of symptom onset.   Pertinent medical history  Fredy needs a return to work/school note.   Weight: 260 lbs   Fredy smokes or uses smokeless " "tobacco.   Weight: 260 lbs    MEDICATIONS: Aleve oral, Tylenol Extra Strength oral, ALLERGIES: NKDA  Clinician Response:  Dear Fredy,   Your symptoms show that you may have coronavirus (COVID-19). This illness can cause fever, cough and trouble breathing. Many people get a mild case and get better on their own. Some people can get very sick.  What should I do?  We would like to test you for this virus. This will be a curbside test done outside the clinic.   1. Please call [phone] to schedule your visit. Explain that you were referred by [facility] to have a COVID-19 test. Be [facility].  The following will serve as your written order for this COVID Test, ordered by me, for the indication of suspected COVID [Z20.828]: The test will be ordered in [facility], our electronic health record, after you are scheduled. It will show as ordered and authorized by Jared Fowler MD.  Order: COVID-19 (Coronavirus) PCR for SYMPTOMATIC testing from [facility].      2. When it's time for your COVID test:  Stay at least 6 feet away from others. (If someone will drive you to your test, stay in the backseat, as far away from the  as you can.)   Cover your mouth and nose with a mask, tissue or washcloth.  Go straight to the testing site. Don't make any stops on the way there or back.      3.Starting now: Stay home and away from others (self-isolate) until:   You've had no fever---and no medicine that reduces fever---for 3 full days (72 hours). And...   Your other symptoms have gotten better. For example, your cough or breathing has improved. And...   At least 10 days have passed since your symptoms started.       During this time, don't leave the house except for testing or medical care.   Stay in your own room, even for meals. Use your own bathroom if you can.   Stay away from others in your home. No hugging, kissing or shaking hands. No visitors.  Don't go to work, school or anywhere else.    Clean \"high " "touch\" surfaces often (doorknobs, counters, handles, etc.). Use a household cleaning spray or wipes. You'll find a full list of  on the EPA website: www.epa.gov/pesticide-registration/list-n-disinfectants-use-against-sars-cov-2.   Cover your mouth and nose with a mask, tissue or washcloth to avoid spreading germs.  Wash your hands and face often. Use soap and water.  Caregivers in these groups are at risk for severe illness due to COVID-19:  o People 65 years and older  o People who live in a nursing home or long-term care facility  o People with chronic disease (lung, heart, cancer, diabetes, kidney, liver, immunologic)  o People who have a weakened immune system, including those who:   Are in cancer treatment  Take medicine that weakens the immune system, such as corticosteroids  Had a bone marrow or organ transplant  Have an immune deficiency  Have poorly controlled HIV or AIDS  Are obese (body mass index of 40 or higher)  Smoke regularly   o Caregivers should wear gloves while washing dishes, handling laundry and cleaning bedrooms and bathrooms.  o Use caution when washing and drying laundry: Don't shake dirty laundry, and use the warmest water setting that you can.  o For more tips, go to www.cdc.gov/coronavirus/2019-ncov/downloads/10Things.pdf.      How can I take care of myself?   Get lots of rest. Drink extra fluids (unless a doctor has told you not to).   Take Tylenol (acetaminophen) for fever or pain. If you have liver or kidney problems, ask your family doctor if it's okay to take Tylenol.   Adults can take either:    650 mg (two 325 mg pills) every 4 to 6 hours, or...   1,000 mg (two 500 mg pills) every 8 hours as needed.    Note: Don't take more than 3,000 mg in one day. Acetaminophen is found in many medicines (both prescribed and over-the-counter medicines). Read all labels to be sure you don't take too much.   For children, check the Tylenol bottle for the right dose. The dose is based on the " child's age or weight.    If you have other health problems (like cancer, heart failure, an organ transplant or severe kidney disease): Call your specialty clinic if you don't feel better in the next 2 days.       Know when to call 911. Emergency warning signs include:    Trouble breathing or shortness of breath Pain or pressure in the chest that doesn't go away Feeling confused like you haven't felt before, or not being able to wake up Bluish-colored lips or face.  Where can I get more information?   [facility] -- About COVID-19: [email or web address]   CDC -- What to Do If You're Sick: www.cdc.gov/coronavirus/2019-ncov/about/steps-when-sick.html   CDC -- Ending Home Isolation: www.cdc.gov/coronavirus/2019-ncov/hcp/disposition-in-home-patients.html   [facility] -- Caring for Someone: www.cdc.gov/coronavirus/2019-ncov/if-you-are-sick/care-for-someone.html   [facility] -- Interim Guidance for Hospital Discharge to Home: www.[facility].mn.us/diseases/coronavirus/hcp/hospdischarge.pdf   [facility] clinical trials (COVID-19 research studies): clinicalaffairs.[facility]-clinical-trials    Below are the COVID-19 hotlines at the [place] [facility] ([facility]). Interpreters are available.    For health questions: Call 113-865-1365 or 1-215.627.6001 (7 a.m. to 7 p.m.) For questions about schools and childcare: Call 929-970-3095 or 1-463.357.7511 (7 a.m. to 7 p.m.)    Diagnosis: Myalgia  Diagnosis ICD: M79.1

## 2020-06-10 NOTE — LETTER
June 13, 2020        Fredy Mora  [address] [place] [address]  [facility] [id number]    This letter provides a written record that you were tested for COVID-19 on 6/10/20.   Your result was negative.    This means that we didn t find the virus that causes COVID-19 in your sample. A test may show negative when you do actually have the virus. This can happen when the virus is in the early stages of infection, before you feel illness symptoms.    Even if you don t have symptoms, they may still appear. For safety, it s very important to follow these rules.    Keep yourself away from others (self-isolation):      Stay home. Don t go to work, school or anywhere else.     Stay in your own room (and use your own bathroom), if you can.    Stay away from others in your home. No hugging, kissing or shaking hands. No visitors.    Clean  high touch  surfaces often (doorknobs, counters, handles, etc.). Use a household cleaning spray or wipes.    Cover your mouth and nose with a mask, tissue or washcloth to avoid spreading germs.    Wash your hands and face often with soap and water.    Stay in self-isolation until you meet ALL of the guidelines below:    1. You have had no fever for at least 72 hours (that is 3 full days of no fever without the use of medicine that reduces fevers), AND  2. other symptoms (such as cough, shortness of breath) have gotten better, AND  3. at least 10 days have passed since your symptoms first appeared.    Going back to work  Check with your employer for any guidelines to follow for going back to work.    Employers: This document serves as formal notice that your employee tested negative for COVID-19, as of the testing date shown above.    For questions regarding this letter or your Negative COVID-19 result, call [phone] between 8A to 6:30P (M-F) and 10A to 6:30P (weekends).

## 2020-06-11 LAB
SARS-COV-2 RNA SPEC QL NAA+PROBE: NOT DETECTED
SPECIMEN SOURCE: NORMAL

## 2020-11-16 ENCOUNTER — HEALTH MAINTENANCE LETTER (OUTPATIENT)
Age: 20
End: 2020-11-16

## 2020-11-19 ENCOUNTER — VIRTUAL VISIT (OUTPATIENT)
Dept: FAMILY MEDICINE | Facility: OTHER | Age: 20
End: 2020-11-19
Payer: COMMERCIAL

## 2020-11-19 PROCEDURE — 99421 OL DIG E/M SVC 5-10 MIN: CPT | Performed by: PREVENTIVE MEDICINE

## 2020-11-19 NOTE — PROGRESS NOTES
"Date: 2020 13:25:45  Clinician: John Damon  Clinician NPI: [id number]  Patient: Fredy Mora  Patient : 2000  Patient Address: [address] [place] [address]  Patient Phone: [phone]  Visit Protocol: URI  Patient Summary:  Fredy is a 20 year old ( : 2000 ) male who initiated a [facility] Visit for COVID-19 (Coronavirus) evaluation and screening. When asked the question \"Please sign me up to receive news, health information and promotions from [facility].\", Fredy responded \"No\".    Fredy states his symptoms started 1-2 days ago.   His symptoms consist of malaise, a sore throat, a cough, and nasal congestion. He is experiencing mild difficulty breathing with activities but can speak normally in full sentences.   Symptom details     Nasal secretions: The color of his mucus is clear.    Cough: Fredy coughs a few times an hour and his cough is not more bothersome at night. Phlegm does not come into his throat when he coughs. He does not believe his cough is caused by post-nasal drip.     Sore throat: Fredy reports having mild throat pain (1-3 on a 10 point pain scale), does not have exudate on his tonsils, and can swallow liquids. He is not sure if the lymph nodes in his neck are enlarged. A rash has not appeared on the skin since the sore throat started.      Fredy denies having vomiting, rhinitis, facial pain or pressure, myalgias, chills, teeth pain, ageusia, diarrhea, ear pain, headache, wheezing, fever, nausea, and anosmia. He also denies taking antibiotic medication in the past month and having recent facial or sinus surgery in the past 60 days.   Precipitating events  Within the past week, Fredy has not been exposed to someone with strep throat. He has not recently been exposed to someone with influenza. Fredy has been in close contact with the following high risk individuals: people with asthma, heart disease or diabetes.   Pertinent COVID-19 (Coronavirus) information  Fredy does not work or " volunteer as healthcare worker or a . In the past 14 days, Fredy has not worked or volunteered at a healthcare facility or group living setting.   In the past 14 days, he also has not lived in a congregate living setting.   Fredy has not had a close contact with a laboratory-confirmed COVID-19 patient within 14 days of symptom onset.    Since December 2019, Fredy has been tested for COVID-19 and has not had upper respiratory infection or influenza-like illness.      Result of COVID-19 test: Negative     Date of his COVID-19 test: 06/10/2020      Pertinent medical history  Fredy does not need a return to work/school note.   Weight: 270 lbs   Fredy smokes or uses smokeless tobacco.   Weight: 270 lbs    MEDICATIONS: Aleve oral, Tylenol Extra Strength oral, ALLERGIES: NKDA  Clinician Response:  Dear Fredy,   Your symptoms show that you may have coronavirus (COVID-19). This illness can cause fever, cough and trouble breathing. Many people get a mild case and get better on their own. Some people can get very sick.  Based on the symptoms you have shared, I would like you to be re-checked in 2 to 3 days. Please call your family clinic to set up a video or phone visit.  Will I be tested for COVID-19?  We would like to test you for this virus.   Please call [phone] to schedule your visit. Explain that you were referred by [facility] to have a COVID-19 test. Be ready to share your [facility] visit ID number.   * If you need to schedule in [place] or [facility] please call [phone] or for [employer] employees please call [phone].    The following will serve as your written order for this COVID Test, ordered by me, for the indication of suspected COVID [Z20.828]: The test will be ordered in [employer], our electronic health record, after you are scheduled. It will show as ordered and authorized by Jared Fowler MD.  Order: COVID-19 (Coronavirus) PCR for SYMPTOMATIC testing from [facility].   1.When it's time for your  "COVID test:   Stay at least 6 feet away from others. (If someone will drive you to your test, stay in the backseat, as far away from the  as you can.)   Cover your mouth and nose with a mask, tissue or washcloth.  Go straight to the testing site. Don't make any stops on the way there or back.      2.Starting now: Stay home and away from others (self-isolate) until:   You've had no fever---and no medicine that reduces fever---for one full day (24 hours). And...   Your other symptoms have gotten better. For example, your cough or breathing has improved. And...   At least 10 days have passed since your symptoms started.       During this time, don't leave the house except for testing or medical care.   Stay in your own room, even for meals. Use your own bathroom if you can.   Stay away from others in your home. No hugging, kissing or shaking hands. No visitors.  Don't go to work, school or anywhere else.    Clean \"high touch\" surfaces often (doorknobs, counters, handles, etc.). Use a household cleaning spray or wipes. You'll find a full list of  on the EPA website: www.epa.gov/pesticide-registration/list-n-disinfectants-use-against-sars-cov-2.   Cover your mouth and nose with a mask, tissue or washcloth to avoid spreading germs.  Wash your hands and face often. Use soap and water.  Caregivers in these groups are at risk for severe illness due to COVID-19:  o People 65 years and older  o People who live in a nursing home or long-term care facility  o People with chronic disease (lung, heart, cancer, diabetes, kidney, liver, immunologic)   o People who have a weakened immune system, including those who:   Are in cancer treatment  Take medicine that weakens the immune system, such as corticosteroids  Had a bone marrow or organ transplant  Have an immune deficiency  Have poorly controlled HIV or AIDS  Are obese (body mass index of 40 or higher)  Smoke regularly   o Caregivers should wear gloves while washing " dishes, handling laundry and cleaning bedrooms and bathrooms.  o Use caution when washing and drying laundry: Don't shake dirty laundry, and use the warmest water setting that you can.  o For more tips, go to www.cdc.gov/coronavirus/2019-ncov/downloads/10Things.pdf.      How can I take care of myself?   Get lots of rest. Drink extra fluids (unless a doctor has told you not to)   Take Tylenol (acetaminophen) for fever or pain. If you have liver or kidney problems, ask your family doctor if it's okay to take Tylenol.   Adults can take either:    650 mg (two 325 mg pills) every 4 to 6 hours, or...   1,000 mg (two 500 mg pills) every 8 hours as needed.    Note: Don't take more than 3,000 mg in one day. Acetaminophen is found in many medicines (both prescribed and over-the-counter medicines). Read all labels to be sure you don't take too much.   For children, check the Tylenol bottle for the right dose. The dose is based on the child's age or weight.    If you have other health problems (like cancer, heart failure, an organ transplant or severe kidney disease): Call your specialty clinic if you don't feel better in the next 2 days.       Know when to call 911. Emergency warning signs include:    Trouble breathing or shortness of breath Pain or pressure in the chest that doesn't go away Feeling confused like you haven't felt before, or not being able to wake up Bluish-colored lips or face  Where can I get more information?   [facility] -- About COVID-19: [email or web address]   CDC -- What to Do If You're Sick: www.cdc.gov/coronavirus/2019-ncov/about/steps-when-sick.html   CDC -- Ending Home Isolation: www.cdc.gov/coronavirus/2019-ncov/hcp/disposition-in-home-patients.html   CDC -- Caring for Someone: www.cdc.gov/coronavirus/2019-ncov/if-you-are-sick/care-for-someone.html   [facility] -- Interim Guidance for Hospital Discharge to Home: www.health.[facility].mn./diseases/coronavirus/hcp/hospdischarge.pdf    [facility] clinical trials (COVID-19 research studies): clinicalaffairs.[facility]-clinical-trials    Below are the COVID-19 hotlines at the [facility] ([facility]). Interpreters are available.    For health questions: Call 223-564-3098 or 1-806.261.8642 (7 a.m. to 7 p.m.) For questions about schools and childcare: Call 090-245-7427 or 1-817.682.3388 (7 a.m. to 7 p.m.)       Diagnosis: Cough  Diagnosis ICD: R05

## 2020-11-21 DIAGNOSIS — Z20.822 SUSPECTED COVID-19 VIRUS INFECTION: Primary | ICD-10-CM

## 2020-11-22 DIAGNOSIS — Z20.822 SUSPECTED COVID-19 VIRUS INFECTION: ICD-10-CM

## 2020-11-22 PROCEDURE — U0003 INFECTIOUS AGENT DETECTION BY NUCLEIC ACID (DNA OR RNA); SEVERE ACUTE RESPIRATORY SYNDROME CORONAVIRUS 2 (SARS-COV-2) (CORONAVIRUS DISEASE [COVID-19]), AMPLIFIED PROBE TECHNIQUE, MAKING USE OF HIGH THROUGHPUT TECHNOLOGIES AS DESCRIBED BY CMS-2020-01-R: HCPCS | Performed by: FAMILY MEDICINE

## 2020-11-23 LAB
SARS-COV-2 RNA SPEC QL NAA+PROBE: NOT DETECTED
SPECIMEN SOURCE: NORMAL

## 2021-01-25 ENCOUNTER — ANCILLARY PROCEDURE (OUTPATIENT)
Dept: MRI IMAGING | Facility: CLINIC | Age: 21
End: 2021-01-25
Attending: ORTHOPAEDIC SURGERY
Payer: COMMERCIAL

## 2021-01-25 DIAGNOSIS — M25.551 PAIN OF RIGHT HIP JOINT: ICD-10-CM

## 2021-01-25 PROCEDURE — 73723 MRI JOINT LWR EXTR W/O&W/DYE: CPT | Mod: RT | Performed by: RADIOLOGY

## 2021-01-25 PROCEDURE — A9585 GADOBUTROL INJECTION: HCPCS | Performed by: RADIOLOGY

## 2021-01-25 RX ORDER — GADOBUTROL 604.72 MG/ML
10 INJECTION INTRAVENOUS ONCE
Status: COMPLETED | OUTPATIENT
Start: 2021-01-25 | End: 2021-01-25

## 2021-01-25 RX ADMIN — GADOBUTROL 10 ML: 604.72 INJECTION INTRAVENOUS at 16:10

## 2021-01-25 NOTE — DISCHARGE INSTRUCTIONS
MRI Contrast Discharge Instructions    The IV contrast you received today will pass out of your body in your  urine. This will happen in the next 24 hours. You will not feel this process.  Your urine will not change color.    Drink at least 4 extra glasses of water or juice today (unless your doctor  has restricted your fluids). This reduces the stress on your kidneys.  You may take your regular medicines.    If you are on dialysis: It is best to have dialysis today.    If you have a reaction: Most reactions happen right away. If you have  any new symptoms after leaving the hospital (such as hives or swelling),  call your hospital at the correct number below. Or call your family doctor.  If you have breathing distress or wheezing, call 911.    Special instructions: ***    I have read and understand the above information.    Signature:______________________________________ Date:___________    Staff:__________________________________________ Date:___________     Time:__________    [facility] Departments:    ___[place]: [phone]  ___[facility]: [phone]  ___[place]: [phone] ___[facility]: [phone]  ___[facility]: [phone]  ___[facility]: [phone]  ___[phone]  ___[facility]: [phone]  ___[phone]

## 2021-01-29 NOTE — RESULT ENCOUNTER NOTE
Dear Mr. Mora:    I reviewed your recent MRI study and there has been some improvement but not complete resolution of the abnormality previously visualized.  The good news is that there is no evidence of any kind of enlarging tumor present.    If your hip pain has completely resolved, nothing more needs to be done.  However, if you still have hip or groin pain and symptoms, then we should repeat your x-ray and the SPECT bone scan study to further evaluate for the possibility of a tiny bone tumor called a osteoid osteoma which can cause imaging findings and symptoms that you are experiencing.  Please let us know if this is the case.    Best regards,      Gage Villagran MD  1/29/2021  10:40 AM

## 2021-03-30 NOTE — PROGRESS NOTES
SUBJECTIVE:   Fredy Mora is a 18 year old male who presents to clinic today for the following health issues:        Medication Followup of Prozac 20 mg and Hydroxyzine 25 mg    Taking Medication as prescribed: yes    Side Effects:  None    Medication Helping Symptoms:  Kind of     Patient notes that he has not had anymore panic attacks since increasing prozac.  He notes that his everyday anxiety remains high.  He denies any side effects from prozac.  He does note that he frequently oversleeps, but feels that related to being a 18 year old.  He denies thoughts of suicide or self harm.    Problem list and histories reviewed & adjusted, as indicated.  Additional history: as documented    Patient Active Problem List   Diagnosis     Idiopathic thrombocytopenic purpura (H)     Tobacco abuse     Obesity     Moderate single current episode of major depressive disorder (H)     PARMINDER (generalized anxiety disorder)     History reviewed. No pertinent surgical history.    Social History   Substance Use Topics     Smoking status: Current Every Day Smoker     Packs/day: 0.50     Years: 1.00     Types: Cigarettes     Smokeless tobacco: Never Used     Alcohol use No      Comment: denies any use recently     History reviewed. No pertinent family history.      Current Outpatient Prescriptions   Medication Sig Dispense Refill     FLUoxetine (PROZAC) 40 MG capsule Take 1 capsule (40 mg) by mouth daily 30 capsule 1     hydrOXYzine (ATARAX) 25 MG tablet Take 1-2 tablets (25-50 mg) by mouth every 6 hours as needed for anxiety 20 tablet 0     Ibuprofen (ADVIL PO) Take 400 mg by mouth       [DISCONTINUED] FLUoxetine (PROZAC) 20 MG capsule Take 1 capsule (20 mg) by mouth daily 30 capsule 0     No Known Allergies  BP Readings from Last 3 Encounters:   10/31/18 120/76   09/24/18 130/80   09/05/18 132/82    Wt Readings from Last 3 Encounters:   10/31/18 226 lb 12.8 oz (102.9 kg) (98 %)*   09/24/18 223 lb 9.6 oz (101.4 kg) (98 %)*  "  09/05/18 226 lb 9.6 oz (102.8 kg) (98 %)*     * Growth percentiles are based on CDC 2-20 Years data.                  Labs reviewed in EPIC    Reviewed and updated as needed this visit by clinical staff       Reviewed and updated as needed this visit by Provider         ROS:  Constitutional, HEENT, cardiovascular, pulmonary, gi and gu systems are negative, except as otherwise noted.    OBJECTIVE:     /76  Pulse 98  Temp 97.7  F (36.5  C) (Oral)  Resp 18  Ht 5' 11\" (1.803 m)  Wt 226 lb 12.8 oz (102.9 kg)  SpO2 98%  BMI 31.63 kg/m2  Body mass index is 31.63 kg/(m^2).  GENERAL: healthy, alert and no distress  RESP: lungs clear to auscultation - no rales, rhonchi or wheezes  CV: regular rate and rhythm, normal S1 S2, no S3 or S4, no murmur, click or rub, no peripheral edema and peripheral pulses strong  MS: no gross musculoskeletal defects noted, no edema  PSYCH: mentation appears normal, affect normal/bright    Diagnostic Test Results:  none     ASSESSMENT/PLAN:     1. Moderate single current episode of major depressive disorder (H)  Patient to increase fluoxetine as below.    - FLUoxetine (PROZAC) 40 MG capsule; Take 1 capsule (40 mg) by mouth daily  Dispense: 30 capsule; Refill: 1    2. PARMINDER (generalized anxiety disorder)    - FLUoxetine (PROZAC) 40 MG capsule; Take 1 capsule (40 mg) by mouth daily  Dispense: 30 capsule; Refill: 1    FUTURE APPOINTMENTS:       - Follow-up visit in 1 month.    [facility]" Hydroxyzine Counseling: Patient advised that the medication is sedating and not to drive a car after taking this medication.  Patient informed of potential adverse effects including but not limited to dry mouth, urinary retention, and blurry vision.  The patient verbalized understanding of the proper use and possible adverse effects of hydroxyzine.  All of the patient's questions and concerns were addressed.

## 2021-09-18 ENCOUNTER — HEALTH MAINTENANCE LETTER (OUTPATIENT)
Age: 21
End: 2021-09-18

## 2022-01-08 ENCOUNTER — HEALTH MAINTENANCE LETTER (OUTPATIENT)
Age: 22
End: 2022-01-08

## 2022-11-03 NOTE — PATIENT INSTRUCTIONS
[facility]    If you have any questions regarding to your visit please contact your care team:       Team Red:   Clinic Hours Telephone Number   Dr. Ivania Gamboa, NP 7am-7pm  Monday - Thursday   7am-5pm  Fridays  [phone]  (Appointment scheduling available 24/7)   Urgent Care - [place] and [facility] - 11am-9pm Monday-Friday Saturday-Sunday- 9am-5pm   [place] - 5pm-9pm Monday-Friday Saturday-Sunday- 9am-5pm  [phone] - [place]  [phone] - [place]       What options do I have for a visit other than an office visit? We offer electronic visits (e-visits) and telephone visits, when medically appropriate.  Please check with your medical insurance to see if these types of visits are covered, as you will be responsible for any charges that are not paid by your insurance.      You can use [email or web address] (secure electronic communication) to access to your chart, send your primary care provider a message, or make an appointment. Ask a team member how to get started.     For a price quote for your services, please call our Consumer Price Line at [phone] or our Imaging Cost estimation line at [phone] (for imaging tests).    Wilfrid Carrasquillo    
None

## 2022-11-20 ENCOUNTER — HEALTH MAINTENANCE LETTER (OUTPATIENT)
Age: 22
End: 2022-11-20

## 2022-11-25 ENCOUNTER — OFFICE VISIT (OUTPATIENT)
Dept: FAMILY MEDICINE | Facility: CLINIC | Age: 22
End: 2022-11-25
Payer: COMMERCIAL

## 2022-11-25 VITALS
WEIGHT: 284 LBS | OXYGEN SATURATION: 96 % | TEMPERATURE: 98 F | HEIGHT: 72 IN | DIASTOLIC BLOOD PRESSURE: 86 MMHG | HEART RATE: 89 BPM | SYSTOLIC BLOOD PRESSURE: 136 MMHG | BODY MASS INDEX: 38.47 KG/M2

## 2022-11-25 DIAGNOSIS — K21.9 GASTROESOPHAGEAL REFLUX DISEASE, UNSPECIFIED WHETHER ESOPHAGITIS PRESENT: ICD-10-CM

## 2022-11-25 DIAGNOSIS — F41.0 PANIC DISORDER WITHOUT AGORAPHOBIA: ICD-10-CM

## 2022-11-25 DIAGNOSIS — F41.1 GAD (GENERALIZED ANXIETY DISORDER): Primary | ICD-10-CM

## 2022-11-25 RX ORDER — FLUOXETINE 10 MG/1
CAPSULE ORAL
Qty: 90 CAPSULE | Refills: 0 | Status: SHIPPED | OUTPATIENT
Start: 2022-11-25 | End: 2023-05-12

## 2022-11-25 RX ORDER — PROPRANOLOL HYDROCHLORIDE 10 MG/1
TABLET ORAL
COMMUNITY
Start: 2022-11-18 | End: 2022-11-25

## 2022-11-25 RX ORDER — PROPRANOLOL HYDROCHLORIDE 10 MG/1
TABLET ORAL
Qty: 60 TABLET | Refills: 1 | Status: SHIPPED | OUTPATIENT
Start: 2022-11-25 | End: 2022-12-30

## 2022-11-25 ASSESSMENT — PATIENT HEALTH QUESTIONNAIRE - PHQ9
5. POOR APPETITE OR OVEREATING: MORE THAN HALF THE DAYS
SUM OF ALL RESPONSES TO PHQ QUESTIONS 1-9: 7

## 2022-11-25 ASSESSMENT — ANXIETY QUESTIONNAIRES
7. FEELING AFRAID AS IF SOMETHING AWFUL MIGHT HAPPEN: NEARLY EVERY DAY
GAD7 TOTAL SCORE: 14
IF YOU CHECKED OFF ANY PROBLEMS ON THIS QUESTIONNAIRE, HOW DIFFICULT HAVE THESE PROBLEMS MADE IT FOR YOU TO DO YOUR WORK, TAKE CARE OF THINGS AT HOME, OR GET ALONG WITH OTHER PEOPLE: VERY DIFFICULT
6. BECOMING EASILY ANNOYED OR IRRITABLE: NOT AT ALL
5. BEING SO RESTLESS THAT IT IS HARD TO SIT STILL: NOT AT ALL
GAD7 TOTAL SCORE: 14
1. FEELING NERVOUS, ANXIOUS, OR ON EDGE: NEARLY EVERY DAY
3. WORRYING TOO MUCH ABOUT DIFFERENT THINGS: NEARLY EVERY DAY
2. NOT BEING ABLE TO STOP OR CONTROL WORRYING: NEARLY EVERY DAY

## 2022-11-25 NOTE — PATIENT INSTRUCTIONS
"  For food:  - Focus on easily digestable food  - Multiple small meals  - No eating within 30 minutes of laying down  - bananas, rice, toast, potatoes, apples, protein drinks  - Avoid spicy, fried/fatty, acidic foods  - Lots of fluids    Take omeprazole (stomach acid medication) two times daily. Do not take within an hour of a meal.    Continue to use propranolol as needed.    Start taking fluoxetine 10 mg (1 tablet) every day. Take this daily for 14 days. If you are not experiencing any side effects, increase your dose to 20 mg (2 tablets) per day.    Follow-up for an in person or telephone visit in 4 weeks for a medication check.     Finding a therapist who is a good fit is important.    We can refer you within the [employer] system, where there are numerous therapists available.    If you would like to find a therapist closer to your home/work, or if you would prefer a therapist of a specific gender, or with specific interests, consider using the following tool to search for local therapy options. You will need to check with your insurance that the office is covered and to see if the provider is taking new patients. Our office can provide a referral if needed.    [email or web address]  Click on \"Find a Therapist\" link   "

## 2022-11-25 NOTE — PROGRESS NOTES
CC: New Patient, Emergency Room follow up, and Anxiety        ASSESSMENT/PLAN:   Fredy was seen today for new patient, emergency room follow up and anxiety.    Diagnoses and all orders for this visit:    PARMINDER (generalized anxiety disorder)  -     propranolol (INDERAL) 10 MG tablet; TAKE 1 TABLET (10 MG) BY MOUTH 3 TIMES A DAY IF NEEDED (PALPITATIONS/FAST HEARTRATE)  -     FLUoxetine (PROZAC) 10 MG capsule; Take 1 capsule (10 mg) by mouth daily for 14 days, THEN 2 capsules (20 mg) daily for 14 days.    Panic disorder without agoraphobia  -     propranolol (INDERAL) 10 MG tablet; TAKE 1 TABLET (10 MG) BY MOUTH 3 TIMES A DAY IF NEEDED (PALPITATIONS/FAST HEARTRATE)  -     FLUoxetine (PROZAC) 10 MG capsule; Take 1 capsule (10 mg) by mouth daily for 14 days, THEN 2 capsules (20 mg) daily for 14 days.    Gastroesophageal reflux disease, unspecified whether esophagitis present  -     omeprazole (PRILOSEC) 20 MG DR capsule; Take 1 capsule (20 mg) by mouth 2 times daily    22 year old male with a history of depression, anxiety/panic disorder and ITP presenting with symptoms of nausea, anorexia, increasing anxiety and palpitations.    Likely some component of GERD present with symptoms inadequately controlled by Tums and several lifestyle risks. Will treat with BID PPI x 6-8 weeks and monitor for improvement.    Suspect palpitations are secondary to anxiety as patient is able to identify heart rate increases secondary to panic episodes. ER work-up reassuring. Will continue to monitor with anxiety treatment.     After a discussion of risks and benefits, patient agreeable to a trial of fluoxetine for anxiety. Common side effects were discussed and the patient was notified it takes 4-6 weeks to see full impact of this medication. Self cares and the importance of therapy/counseling were also discussed. Patient will follow-up in 4 weeks for a medication check, sooner with any questions, concerns or problems with the medication.  "    Worrisome signs and symptoms were discussed with Fredy and he was instructed to return to the clinic for concerning symptoms or to call with questions. All patient questions answered.    Total time spent with patient 47 minutes, including review of cart, care of patient, coordination of care and communication of care plan.     Follow up: 4 weeks    Airam Payne MD/MPH  Family Medicine      SUBJECTIVE: Fredy is a 22 year old male who comes in with the following concerns:    Early/beginning of month, started to develop some brain fog. Decided to \"try to ride it out\". Then started to develop diarrhea, reduced appetite.     Started to feel physically sick on top of things. Brain fog is a typical symptom of his anxiety but the abdominal symptoms are not. Went to be seen in the ER - work-up there included cardiac monitoring, labs (CBC, CMP, TSH, D-dimer). His heart rate improved with administration of IV diphenhydramine.    Since leaving the ER, symptoms are worse than before. Trouble with eating anything. Wakes up nauseous in the morning.     Has been feeling very anxious as well. Feels that a lot of his anxiety is fixated on his physical symptoms.    Never had physical symptoms with anxiety in the past. Typically has been able to self-manage anxiety. Had taken fluoxetine in the past. Didn't take it consistently enough to get full benefit.    October schedule was very disrupted, staying up later. Sleeping less. Does feel like anxiety was starting to increase during this time.  Diet - poor for a long time. No drastic changes prior to symptom onset. Tried going gluten free. Vaping daily. Alcohol use had increased during the month of October. Drank 2-3 times per month, which is more than previous and drank to the point of intoxication. No other substance use.    Panic attacks - have started again, hasn't had for months. Happening more than once per week. Tends to happen when focusing on physical health.     Advil - " "taking as needed for headaches. Headaches have improved with hydration. Taking 2x/ in the last 2 months.    Using Tums - pretty sure he has acid reflux. Has had ongoing symptoms prior to this.  No blood in stool. Loose/diarrhea x 1.5 weeks. Has some formed stool as well.  No vomiting.    Has taken propranolol PRN for anxiety. Seems to help some with keeping heart rate from going up.     Does not see a therapist regularly    PHQ-9 score:    PHQ 11/25/2022   PHQ-9 Total Score 7   Q9: Thoughts of better off dead/self-harm past 2 weeks Not at all         PARMINDER-7 SCORE 10/31/2018 12/26/2018 11/25/2022   Total Score 9 11 14           OBJECTIVE:   /86   Pulse 89   Temp 98  F (36.7  C)   Ht 1.818 m (5' 11.58\")   Wt 128.8 kg (284 lb)   SpO2 96%   BMI 38.98 kg/m    General: Well-appearing in NAD   HEENT: Normocephalic, atraumatic. Mucus membranes moist.   Resp: No respiratory distress   MSK: No peripheral edema.   Skin: No rashes or lesions noted.   Psych: Appropriately dressed and groomed. Affect congruent with mood     "

## 2022-12-08 ENCOUNTER — TELEPHONE (OUTPATIENT)
Dept: FAMILY MEDICINE | Facility: CLINIC | Age: 22
End: 2022-12-08

## 2022-12-08 DIAGNOSIS — K21.9 GASTROESOPHAGEAL REFLUX DISEASE, UNSPECIFIED WHETHER ESOPHAGITIS PRESENT: Primary | ICD-10-CM

## 2022-12-08 NOTE — TELEPHONE ENCOUNTER
Prior Authorization Retail Medication Request    Medication/Dose: omeprazole (PRILOSEC) 20 MG DR blanca [place], RN  12/08/22 2:50 PM

## 2022-12-08 NOTE — TELEPHONE ENCOUNTER
PA Initiation    Medication: omeprazole (PRILOSEC) 20 MG DR capsule   Insurance Company: [employer] - Phone [phone] Fax [phone]  Pharmacy Filling the Rx: [id number] - [place] - [address] AT [facility] & [address]  Filling Pharmacy Phone: [phone]  Filling Pharmacy Fax: [phone]  Start Date: 12/8/2022

## 2022-12-09 RX ORDER — OMEPRAZOLE 40 MG/1
40 CAPSULE, DELAYED RELEASE ORAL DAILY
Qty: 90 CAPSULE | Refills: 0 | Status: SHIPPED | OUTPATIENT
Start: 2022-12-09 | End: 2022-12-30

## 2022-12-09 NOTE — TELEPHONE ENCOUNTER
PRIOR AUTHORIZATION DENIED    Medication: omeprazole (PRILOSEC) 20 MG DR capsule--DENIED    Denial Date: 12/9/2022    Denial Rational: Plan covers up to 1 capsule per day.     Appeal Information:

## 2022-12-30 ENCOUNTER — OFFICE VISIT (OUTPATIENT)
Dept: FAMILY MEDICINE | Facility: CLINIC | Age: 22
End: 2022-12-30
Payer: COMMERCIAL

## 2022-12-30 VITALS
SYSTOLIC BLOOD PRESSURE: 125 MMHG | TEMPERATURE: 95.5 F | BODY MASS INDEX: 38.15 KG/M2 | RESPIRATION RATE: 15 BRPM | OXYGEN SATURATION: 97 % | HEART RATE: 93 BPM | DIASTOLIC BLOOD PRESSURE: 83 MMHG | WEIGHT: 278 LBS

## 2022-12-30 DIAGNOSIS — F41.0 PANIC DISORDER WITHOUT AGORAPHOBIA: ICD-10-CM

## 2022-12-30 DIAGNOSIS — F41.1 GAD (GENERALIZED ANXIETY DISORDER): Primary | ICD-10-CM

## 2022-12-30 DIAGNOSIS — F32.1 MODERATE SINGLE CURRENT EPISODE OF MAJOR DEPRESSIVE DISORDER (H): ICD-10-CM

## 2022-12-30 RX ORDER — PROPRANOLOL HYDROCHLORIDE 10 MG/1
TABLET ORAL
Qty: 60 TABLET | Refills: 1 | Status: SHIPPED | OUTPATIENT
Start: 2022-12-30

## 2022-12-30 ASSESSMENT — ANXIETY QUESTIONNAIRES
5. BEING SO RESTLESS THAT IT IS HARD TO SIT STILL: SEVERAL DAYS
7. FEELING AFRAID AS IF SOMETHING AWFUL MIGHT HAPPEN: NEARLY EVERY DAY
IF YOU CHECKED OFF ANY PROBLEMS ON THIS QUESTIONNAIRE, HOW DIFFICULT HAVE THESE PROBLEMS MADE IT FOR YOU TO DO YOUR WORK, TAKE CARE OF THINGS AT HOME, OR GET ALONG WITH OTHER PEOPLE: VERY DIFFICULT
2. NOT BEING ABLE TO STOP OR CONTROL WORRYING: NEARLY EVERY DAY
3. WORRYING TOO MUCH ABOUT DIFFERENT THINGS: NEARLY EVERY DAY
6. BECOMING EASILY ANNOYED OR IRRITABLE: SEVERAL DAYS
GAD7 TOTAL SCORE: 16
1. FEELING NERVOUS, ANXIOUS, OR ON EDGE: NEARLY EVERY DAY
GAD7 TOTAL SCORE: 16

## 2022-12-30 ASSESSMENT — PATIENT HEALTH QUESTIONNAIRE - PHQ9
5. POOR APPETITE OR OVEREATING: MORE THAN HALF THE DAYS
SUM OF ALL RESPONSES TO PHQ QUESTIONS 1-9: 11

## 2022-12-30 NOTE — PATIENT INSTRUCTIONS
Start taking fluoxetine 10 mg (1 tablet) every day. Take this daily for 14 days. If you are not experiencing any side effects, increase your dose to 2 tablets (20 mg) per day.    Follow-up for an in person or telephone visit in 4 weeks for a medication check.    STOP omeprazole for now  Continue propranolol as needed

## 2022-12-30 NOTE — PROGRESS NOTES
"CC: Recheck Medication (Checking in on meds)        ASSESSMENT/PLAN: 21 y/o M with a history of PARMINDER/panic disorder, depression, former marijuana use presenting for anxiety follow-up. He has not started his selective serotonin reuptake inhibitor medication but has established with a therapist. We discussed today again the role of SSRIs in management of anxiety. Discussed safety profile of these medications. Patient is in agreement with trial of medication and will continue to follow with therapist.    Fredy was seen today for recheck medication.    Diagnoses and all orders for this visit:    PARMINDER (generalized anxiety disorder)  -     propranolol (INDERAL) 10 MG tablet; TAKE 1 TABLET (10 MG) BY MOUTH 3 TIMES A DAY IF NEEDED (PALPITATIONS/FAST HEARTRATE)    Moderate single current episode of major depressive disorder (H)    Panic disorder without agoraphobia  -     propranolol (INDERAL) 10 MG tablet; TAKE 1 TABLET (10 MG) BY MOUTH 3 TIMES A DAY IF NEEDED (PALPITATIONS/FAST HEARTRATE)        Worrisome signs and symptoms were discussed with Fredy and he was instructed to return to the clinic for concerning symptoms or to call with questions. All patient questions answered.    Follow up: 1 month    Airam Payne MD/MPH  Family Medicine      SUBJECTIVE: Fredy is a 22 year old male who comes in with the following concerns:    Medication follow-up  Last month mood has been \"Ups and downs\"  Felt like he was doing better for a while and didn't need medication so he decided not to start the fluoxetine  This past week with the holidays has been especially challenging  Had to leave work earlier this week due to anxiety. Unable to get \"grounded)  Continues to use propranolol BID    OTC omeprazole - felt like he was doing a little bit better. Took 14 days and then stopped it.  Not sure if this was helping his stomach pain or if this was during a period where his anxiety was better as well.  Saw a little bit of dried blood with wiping " after bowel movement this morning and is concerned about this.    Feels like stomach is worse when anxiety is worse, so this has felt flared this week  Struggles with eating before 2PM    Just signed up for [facility]   Just talked to a therapist this morning. More of an intake appointment, but is planning to continue to follow    PHQ 3/12/2020 11/25/2022 12/30/2022   PHQ-9 Total Score 2 7 11   Q9: Thoughts of better off dead/self-harm past 2 weeks Not at all Not at all Not at all     PARMINDER-7 SCORE 12/26/2018 11/25/2022 12/30/2022   Total Score 11 14 16           OBJECTIVE:   /83 (BP Location: Left arm, Patient Position: Sitting, Cuff Size: Adult Large)   Pulse 93   Temp (!) 95.5  F (35.3  C) (Skin)   Resp 15   Wt 126.1 kg (278 lb)   SpO2 97%   BMI 38.15 kg/m    General: Well-appearing in NAD   HEENT: Normocephalic, atraumatic. Mucus membranes moist.   Resp: No respiratory distress   MSK: No peripheral edema.   Skin: No rashes or lesions noted.   Psych: Appropriate dress and grooming. Affect is congruent with mood.

## 2023-01-30 ENCOUNTER — MYC MEDICAL ADVICE (OUTPATIENT)
Dept: FAMILY MEDICINE | Facility: CLINIC | Age: 23
End: 2023-01-30

## 2023-01-30 DIAGNOSIS — F41.1 GAD (GENERALIZED ANXIETY DISORDER): Primary | ICD-10-CM

## 2023-01-30 DIAGNOSIS — F41.0 PANIC DISORDER WITHOUT AGORAPHOBIA: ICD-10-CM

## 2023-01-30 DIAGNOSIS — F32.1 MODERATE SINGLE CURRENT EPISODE OF MAJOR DEPRESSIVE DISORDER (H): ICD-10-CM

## 2023-03-10 NOTE — PROGRESS NOTES
"     PSYCHIATRIC  DIAGNOSTIC  EVALUATION                                                                    Name:  Fredy Mora  : 2000     Telemedicine Visit: The patient's condition can be safely assessed and treated via synchronous audio and visual telemedicine encounter.      Reason for Telemedicine Visit: COVID 19 pandemic and the social and physical recommendations by the CDC and [facility].      Originating Site (Patient Location): Patient's home     Distant Site (Provider Location): Provider Remote Setting     Consent:  The patient/guardian has verbally consented to: the potential risks and benefits of telemedicine (video visit or phone) versus in person care; bill my insurance or make self-payment for services provided; and responsibility for payment of non-covered services.     Mode of Communication:  M[employer] video platform      As the provider I attest to compliance with applicable laws and regulations related to telemedicine.    Source of Referral:  Primary Care Provider: Airam Payne MD   Current Psychotherapist: Josh Torres ?surname    PCP Clinical Question for referral: \"anxiety\"    The [facility] psychiatry providers act as a specialty service for Primary Care Providers in the [facility] who seek to optimize medications for unstable patients. Once medications have been optimized, [facility] providers discharge the patient back to the referring Primary Care Provider for ongoing medication management. This type of system allows [facility] to serve a high volume of patients.     Identifying Data:  Patient is a 22 year old, single Choose not to Answer Not  or  male  who presents for initial visit with me.    Patient prefers to be called: \"Fredy\".  Patient is currently employed full time.     HPI  Patient presents for initial psychiatric evaluation with the [facility] ([facility]) for evaluation of anxiety.      RECORDS AVAILABLE FOR REVIEW: EHR records " "through Epic .       Chief Complaint:  \"Anxiety\"    Fredy is a 22M seen today for initial evaluation with [facility] after referral from PCP for anxiety. Denies past psychiatric providers. He reports \"struggeld with anxiety my whole life\", 1 brief trial of fluoxetine for 2 weeks in 2018. PCP reinitiated fluoxetine Nov 2022 following ED visit for panic, along with propranolol 10 mg PRN. Additionally started with therapist at that time. Patient just started fluoxetine 10 mg 1 month ago, has not increased to 20 mg as rx by PCP as he worries about changing the medication. He denies intolerable SE at this time, but does wonder about insomnia (improving - moved med to at bedtime, unsure if better) and ongoing digestive issues (present prior to +fluoxetine). Reports he stopped the propranolol when taking the fluoxetine d/t concerns for interaction.     ANX: Today rates anxiety as 5/10, but can spike to 8-9/10. He does report it has improved since +fluoxetine (would have rated as 6-7/10 prior to med). Reports \"there's been improvement for sure. Not a lot, but my ability to function back on track.\" Reports this period of anxiety began worsening ~Oct 2022 without known trigger, and had significant issues w/ social and occupational functioning. He was very concerned with physical symptoms and \"hypochondria\", and was also having increased social anxiety - both of which he does feel has improved with fluoxetine. Reports anxiety now is often r/t \"existentialism\". Anxiety is often associated with feeling \"foggy, out of it\", dizziness, and GI issues (diarrhea, nausea)  PANIC: His anxiety was leading to frequent likely panic attacks, reports feeling SOB, \"can't get the air I want, about to pass out, heart is just going crazy\", depersonalization or \"spacing out\". [facility] ED 11/2022 and \"they confirmed you're not dying, everything looks decent\" when he was referred to PCP. Has gone to ED at least 1 previous time in 2018. Reports since " "starting fluoxetine has had 2 (1 on first day of prozac that he attributes to anxiety starting a med). Lasts for ~30 minutes. He does report good improvement with propranolol previously with \"physically calming down\", denies any SE.    Reports low-mod depression (rates as 3/10), reports some days with low mood, anhedonia, low energy. Denies guilt or feelings of worthlessness. Denies SI/SIB/HI. No history of suicide attempts or psychiatric hospitalizations (outside ~3 ED visits for panic). Denies history of psychosis, reggie. Denies all current substance use, no history of CD tx. Does report a history of trauma at a young age but declines further information at this time. Requires further eval at patient's comfort. Denies any ongoing safety issues.        Current Medications:    Current Outpatient Medications:      FLUoxetine (PROZAC) 10 MG capsule, Take 1 capsule (10 mg) by mouth daily for 14 days, THEN 2 capsules (20 mg) daily for 14 days., Disp: 90 capsule, Rfl: 0     Ibuprofen (ADVIL PO), Take 400 mg by mouth, Disp: , Rfl:      Naproxen Sodium (ALEVE PO), , Disp: , Rfl:      propranolol (INDERAL) 10 MG tablet, TAKE 1 TABLET (10 MG) BY MOUTH 3 TIMES A DAY IF NEEDED (PALPITATIONS/FAST HEARTRATE), Disp: 60 tablet, Rfl: 1    Medication side effects: possible insomnia, worsened GI upset    The [place] Prescription Monitoring Program has been reviewed and there are no concerns about diversionary activity for controlled substances at this time.  No data for controlled substances over the last one year.    Psychiatric Review of Symptoms:  Depression: depressed mood, little interest / pleasure, appetite change or significant weight loss / gain, sleep changes (insomnia or hypersomnia) and fatigue of loss of energy Self rates as \"lower like a 3-4\"/10, where 0 is none at all and 10 being severe depression.  SI/SIB/HI: denies all.   Anxiety: excessive worry, difficult to control, restlessness / feeling keyed up,  easily " "fatigued,  difficulty concentrating or mind going blank, irritability, muscle tension and sleep disturbances (difficulty falling / staying asleep, or restless / unsatisfying) Self rates as 5 - spikes of 8-9/10, where 0 is none at all and 10 being severe anxiety.  Sleep / changes: Typically in bed ~11:30, denies significant onset issue. Can have trouble staying asleep, or will have early AM wakeup. Getting ~7 hours now (improved from past 5-6 hrs). Reports \"been awhile since felt rested, but not overly tired.\"  Energy: tapers off or can have a \"lull\" during the day especially during work. Noticed he is tired earlier ~around 9-10 PM.   Appetite or weight changes: With high anxiety can have very low appetite - which has improved, \"been pretty consistent lately.\" Does think he lost a small amount of weight \"but now leveled out.\"   Irritability / mood swings: does note some irritability when anxiou  [place] / impulsivity / racing thoughts / speech: denies   OCD: denies  Skin picking / hair pulling: biting fingers, lips - \"nervous habits since little\".   Psychosis/AH/VH/delusions: denies  Paranoia: denies  Eating DO: denies  Trauma sx: does endorse hx of trauma. Sx require further evaluation at patient's comfort.    PHQ-9 scores:   PHQ-9 SCORE 11/25/2022 12/30/2022 3/12/2023   PHQ-9 Total Score MyChart - - 6 (Mild depression)   PHQ-9 Total Score 7 11 6       PARMINDER-7 scores:    PARMINDER-7 SCORE 11/25/2022 12/30/2022 3/12/2023   Total Score - - 10 (moderate anxiety)   Total Score 14 16 10              Medical Review of Systems:  10 systems (general, cardiovascular, respiratory, eyes, ENT, endocrine, GI, , M/S, neurological) were reviewed. Most pertinent finding(s) is/are: reports some ongoing nausea, diarrhea abraham when anxious. Denies V/C. Denies current fever, headache, chest pain / tightness / palpitations, dizziness or syncope. Does endorse \"a little shaky, very slightly\", denies other tics / tremors / abnormal muscle mvmts (did " "have some \"twitchiness\" at bedtime w/ start of fluoxetine - resolved). The remaining systems are all unremarkable.    Vitals:   There were no vitals taken for this visit.    Pulse Readings from Last 5 Encounters:   12/30/22 93   11/25/22 89   09/18/19 113   08/27/19 117   08/08/19 131     Wt Readings from Last 5 Encounters:   12/30/22 126.1 kg (278 lb)   11/25/22 128.8 kg (284 lb)   03/12/20 119.8 kg (264 lb 3.2 oz)   09/30/19 120.2 kg (265 lb)   09/18/19 116.6 kg (257 lb)     BP Readings from Last 5 Encounters:   12/30/22 125/83   11/25/22 136/86   09/18/19 129/79   08/27/19 (!) 142/83   08/08/19 137/87       Psychiatric History:   Hospitalizations: None  History of Commitment? No   Past Treatment: counseling and medication(s) from physician / PCP  History of Suicidal Ideation: denies  Suicide Attempts: No   History of Violence/Aggression: No   Self-injurious Behavior: Denies  Electroconvulsive Therapy (ECT) or Transcranial Magnetic Stimulation (TMS): No   GeneSight Genetic Testing: No     Past psychotropic medication trials include but are not limited to:   Past trial of fluoxetine (2018) ~2 weeks - stopped d/t feeling not needed.    Substance Use History:  Current Use of Drugs/Alcohol: denies all over last few months.   Past Use of Drugs/Alcohol: Reports alcohol up to this fall, and a few times in past used cannabis but worsened his anxiety, dissociation.  Patient reports no problems as a result of their drinking / drug use.   Patient has not received chemical dependency treatment in the past  Recovery Programming Involvement: Not Applicable    Tobacco use: reports daily vaping, increased lately.     Caffeine:   Yes  1 cups/day of coffee in AM, reduced with high anxiety.     Based on the clinical interview, there  are not indications of drug or alcohol abuse. Continue to monitor.   Discussed effect of substance use on overall health.   CAGE-AID Score today was: 1    Family History:   Patient reported family " "history includes: No family history on file.   Mental Illness History: No known diagnosed conditions. Reports younger brother \"over emotional-ness\", mother: \"anxiety issue for a little bit. Maybe in the family tree.\" Denies known BAD, schizophrenia.  Substance Abuse History: Denies  Suicide History: Denies  Medications that helped: Unknown     Social History:   Birth place: [place], [place]  Childhood: Yes intact home and Reported as raised by both bio parents  Siblings: 2 half siblings  Highest education level was some college.   Employment Status: full time: [employer], [employer] (2.5 years)  Relationship status: single.   Current Living situation:  With parents. Feels safe at home.  Children: zero   Firearms/Weapons Access: Yes Locked up and No access, belong to his father.   Service: No  Support: parents (not always r/t his mental health), a few friends, therapist     Legal History:  No: Patient denies any legal history.    Significant Losses / Trauma / Abuse / Neglect Issues:  There are indications or report of significant loss, trauma, abuse or neglect issues related to: reports a \"Situation\" around 8-10 years old. No further information provided at this time. Requires further evaluation at patient's comfort.   Issues of possible neglect are not present.   Recommended that patient call 911 or go to the local ED should there be a change in any of these risk factors.       Past Medical History:  Reviewed per Electronic Medical Record Today    Past Medical History:   Diagnosis Date     Idiopathic thrombocytopenic purpura (H)      Idiopathic thrombocytopenic purpura (H)      Moderate single current episode of major depressive disorder (H) 9/5/2018     Obesity       Surgery: No past surgical history on file.  Food and Medicine Allergies:   No Known Allergies  Seizures or Head Injury: Denies  Diet: No Restrictions  Exercise: No regular exercise program reported.  Supplements: none      Mental " Status Exam:  Alertness: alert  and oriented   Appearance: casually groomed  Behavior/Demeanor: cooperative but guarded, with fair eye contact in beginning of eval, then turned camera away from face.  Speech: normal and regular rate and rhythm  Language: intact and no problems  Psychomotor: normal or unremarkable  Mood: worried  Affect: guarded; was congruent to mood; was congruent to content  Thought Process/Associations: unremarkable  Thought Content:  Reports none;  Denies suicidal ideation, violent ideation and delusions  Perception:  Reports none;  Denies auditory hallucinations and visual hallucinations  Insight: adequate  Judgment: adequate for safety and intact  Cognition: does  appear grossly intact; formal cognitive testing was not done  Recent and Remote Memory: Intact to interview. Not formally assessed. No amnesia.  Attention Span and Concentration: normal  Fund of Knowledge: appropriate  Gait and Station: unremarkable via seated video    Strengths and Opportunities:   Fredy Mora identified the following strengths or resources that may help he succeed in counseling: family support and established therapeutic relationship.     Things that may interfere with the patient's success include:  financial hardship.    Protective Factors: future oriented, social support, access to care as needed, reasons for living and displaying help seeking behavior    Static Risk Factors: age and sex    Dynamic Risk Factors: anxiety and access to means    There are no language or communication issues or need for modification in treatment.   There are no ethnic, cultural or Islam factors that may be relevant for therapy.  Client identified their preferred language to be English.  Client does not need the assistance of an  or other support involved in therapy.    Suicide Risk Assessment:  Based on review of above risk and protective factors and today's exam, pt is at low elevated risk of harm to self or  "others. He does not meet criteria for a 72 hr hold and remains appropriate for ongoing outpatient care. The patient convincingly denies suicidality today. There was no deceit detected, and the patient presented in a manner that was believable. Local community safety resources reviewed and sent to patient to use if needed.    Recommended that patient call 911 or go to the local ED should there be a change in any of these risk factors.    DSM5  Diagnosis:  300.02 (F41.1) Generalized Anxiety Disorder with panic attacks    Medical Comorbidities Include:   Patient Active Problem List    Diagnosis Date Noted     Moderate single current episode of major depressive disorder (H) 09/05/2018     Priority: Medium     PARMINDER (generalized anxiety disorder) 09/05/2018     Priority: Medium     Tobacco abuse 05/08/2018     Priority: Medium     Obesity      Priority: Medium     Idiopathic thrombocytopenic purpura (H) 04/03/2017     Priority: Medium       DIFFERENTIAL DIAGNOSIS: R/O major depressive disorder, somatic symptom disorder, Illness Anxiety Disorder, PTSD     Medical comorbidities impacting or contributing to clinical picture: ITP  Known issue that I take into account for their medical decisions, no current exacerbations or new concerns.    Impression:  Fredy Mora is a 22 year old Choose not to Answer, male who presents for initial visit with [facility] ([facility]) for medication management. He reports \"struggeld with anxiety my whole life\", 1 brief trial of fluoxetine for 2 weeks in 2018. PCP reinitiated fluoxetine Nov 2022 following ED visit for panic, along with propranolol 10 mg PRN. Additionally started with therapist at that time. Patient just started fluoxetine 10 mg 1 month ago, has not increased to 20 mg as rx by PCP as he worries about changing the medication. He denies intolerable SE at this time, but does wonder about insomnia (improving - moved med to at bedtime, unsure if better) and " ongoing digestive issues (present prior to +fluoxetine). Reports he stopped the propranolol when taking the fluoxetine d/t concerns for interaction. His anxiety remains moderately high, though he does report some improvement since starting the fluoxetine. He reports 2 panic attacks in last month. Past panic sx did respond well to propranolol.  Reports low-mod depression (rates as 3/10). Denies SI/SIB/HI. No history of suicide attempts or psychiatric hospitalizations (outside ~3 ED visits for panic). Denies history of psychosis, reggie. Denies all current substance use, no history of CD tx. Does report a history of trauma at a young age but declines further information at this time. Requires further eval at patient's comfort. Denies any ongoing safety issues. Given mild improvement with fluoxetine, recommending increasing dose into dosing range (20 mg). Patient is anxious about making the increase and provided information / answered questions to support plan. Additionally, encouraged continued use of propranolol for panic symptoms. Continue with therapy. Follow-up with this provider in 2 weeks. Patient verbalized understanding of the plan.    Medication side effects and alternatives reviewed. Health promotion activities recommended and reviewed today. All questions addressed. Education and counseling completed regarding risks and benefits of medications and psychotherapy options. Recommend therapy for additional support.     Treatment Plan:    INCREASE TO fluoxetine 20 mg (2 x 10 mg caps) per PCP. No script needed.    CONTINUE propranolol 10 mg (1/2 - 1 tablet) as needed for anxiety per PCP. No script needed.    Continue all other medications per primary care provider.     Continue therapy with established provider.    Safety plan reviewed. To the Emergency Department as needed or call after hours crisis line at [phone] or [phone]. [place] Crisis Text Line: Text [place] to 266119 or  Suicide LifeLine Chat:  suicidepreventionlifeline.org/chat    Schedule an appointment with me in 2 weeks or sooner as needed.  Call [facility] at [phone] to schedule.    Follow up with primary care provider as planned or sooner if needed for acute medical concerns.    Call the psychiatric nurse line with medication questions or concerns at [phone].    MyChart may be used to communicate with your provider, but this is not intended to be used for emergencies.    Patient Education:  Medication side effects and alternatives reviewed. Health promotion activities recommended and reviewed today. All questions addressed. Education and counseling completed regarding risks and benefits of medications and psychotherapy options.  Consent provided by patient/guardian  Call the psychiatric nurse line with medication questions or concerns at [phone].  Smart Pipehart may be used to communicate with your provider, but this is not intended to be used for emergencies.  Medlineplus.gov is information for patients.  It is run by the [employer] and it contains information about all disorders, diseases and all medications.       BLACK BOX WARNING: The Food and Drug Administration (FDA) requires that all antidepressants carry a warning that some children, adolescents and young adults may be at increased risk of suicide when taking antidepressants. Anyone taking an antidepressant should be watched closely for worsening depression or unusual behavior especially in the first few weeks after starting an SSRI. Keep in mind, antidepressants are more likely to reduce suicide risk in the long run by improving mood.     Community Resources:    National Suicide Prevention Lifeline: 452.847.4361 (TTY: 642.627.9561). Call anytime for help.  (www.suicidepreventionlifeline.org)  National [place] on Mental Illness (www.rose.org): 440.975.9094 or 170-187-4389.   Mental Health Association (www.mentalhealth.org): 954.557.6670 or  605-351-3123.  [place] Crisis Text Line: Text [place] to [address]1038  Suicide LifeLine Chat: suicidepreventionlifeline.org/chat    Administrative Billin minutes spent on the date of the encounter doing chart review and reviewing referral documents, history and exam of patient, documentation and further activities as noted above.    Video/Phone Start Time: 9:05 AM  Video/Phone End Time: 10:05 AM       Patient Status:  [facility] MD/DO/NP/PA providers offer care a specialty service for Primary Care Providers in the [facility] that seek to optimize psychotropic medications for unstable patients.  Once medications have been optimized, our providers discharge the patient back to the referring Primary Care Provider for ongoing medication management.  This type of system allows our providers to serve a high volume of patients.   Patient will continue to be seen for ongoing consultation and stabilization.    Signed:   Christen Flores, MSN, APRN, PMHNP-BC  [facility] Service ([facility])  [facility]    Chart documentation done in part with Dragon Voice Recognition software.  Although reviewed after completion, some word and grammatical errors may remain.

## 2023-03-12 ASSESSMENT — ANXIETY QUESTIONNAIRES
4. TROUBLE RELAXING: SEVERAL DAYS
GAD7 TOTAL SCORE: 10
6. BECOMING EASILY ANNOYED OR IRRITABLE: SEVERAL DAYS
3. WORRYING TOO MUCH ABOUT DIFFERENT THINGS: MORE THAN HALF THE DAYS
GAD7 TOTAL SCORE: 10
2. NOT BEING ABLE TO STOP OR CONTROL WORRYING: MORE THAN HALF THE DAYS
7. FEELING AFRAID AS IF SOMETHING AWFUL MIGHT HAPPEN: MORE THAN HALF THE DAYS
5. BEING SO RESTLESS THAT IT IS HARD TO SIT STILL: NOT AT ALL
7. FEELING AFRAID AS IF SOMETHING AWFUL MIGHT HAPPEN: MORE THAN HALF THE DAYS
IF YOU CHECKED OFF ANY PROBLEMS ON THIS QUESTIONNAIRE, HOW DIFFICULT HAVE THESE PROBLEMS MADE IT FOR YOU TO DO YOUR WORK, TAKE CARE OF THINGS AT HOME, OR GET ALONG WITH OTHER PEOPLE: VERY DIFFICULT
GAD7 TOTAL SCORE: 10
1. FEELING NERVOUS, ANXIOUS, OR ON EDGE: MORE THAN HALF THE DAYS
8. IF YOU CHECKED OFF ANY PROBLEMS, HOW DIFFICULT HAVE THESE MADE IT FOR YOU TO DO YOUR WORK, TAKE CARE OF THINGS AT HOME, OR GET ALONG WITH OTHER PEOPLE?: VERY DIFFICULT

## 2023-03-12 ASSESSMENT — PATIENT HEALTH QUESTIONNAIRE - PHQ9
10. IF YOU CHECKED OFF ANY PROBLEMS, HOW DIFFICULT HAVE THESE PROBLEMS MADE IT FOR YOU TO DO YOUR WORK, TAKE CARE OF THINGS AT HOME, OR GET ALONG WITH OTHER PEOPLE: SOMEWHAT DIFFICULT
SUM OF ALL RESPONSES TO PHQ QUESTIONS 1-9: 6
SUM OF ALL RESPONSES TO PHQ QUESTIONS 1-9: 6

## 2023-03-13 ENCOUNTER — VIRTUAL VISIT (OUTPATIENT)
Dept: PSYCHIATRY | Facility: CLINIC | Age: 23
End: 2023-03-13
Attending: FAMILY MEDICINE
Payer: COMMERCIAL

## 2023-03-13 DIAGNOSIS — F41.0 PANIC DISORDER WITHOUT AGORAPHOBIA: ICD-10-CM

## 2023-03-13 DIAGNOSIS — F41.1 GAD (GENERALIZED ANXIETY DISORDER): ICD-10-CM

## 2023-03-13 PROCEDURE — 99205 OFFICE O/P NEW HI 60 MIN: CPT | Mod: VID | Performed by: NURSE PRACTITIONER

## 2023-03-13 NOTE — Clinical Note
Thank you for the Psychiatry referral to the [facility] Care Psychiatry Service ([facility]). Our psychiatry providers act as a specialty service for Primary Care Providers in the [facility] who seek to optimize medications for unstable patients.  Once medications have been optimized, our providers discharge the patient back to the referring Primary Care Provider for ongoing medication management.  This type of system allows our providers to serve a high volume of patients.   Please see my Impression and Plan. I will continue to work with them in stabilizing their mood.  If you have any questions or concerns, please let me know. Thank you again!  XIOMARA Zeng, PMSHAUNP-BC [facility] Care Psychiatry Service ([facility]) [facility]

## 2023-03-13 NOTE — PATIENT INSTRUCTIONS
**For crisis resources, please see the information at the end of this document**     Thank you for coming to the [facility].    TREATMENT PLAN:  Medications:   INCREASE TO fluoxetine 20 mg (2 x 10 mg caps) per PCP. No script needed.  CONTINUE propranolol 10 mg (1/2 - 1 tablet) as needed for anxiety per PCP. No script needed.  Continue all other medications per primary care provider.     Consults / Referrals:   - Continue therapy     Follow Up:  Schedule an appointment with me in 2 weeks or sooner as needed.  Call [facility] at [phone] to schedule.  Follow up with primary care provider as planned or sooner if needed for acute medical concerns.  Call the psychiatric nurse line with medication questions or concerns at [phone].  Yoovihart may be used to communicate with your provider, but this is not intended to be used for emergencies.      MEDICATION REFILLS:  If you need any refills please call your pharmacy and they will contact us. Our fax number for refills is [phone]. Please allow three business for refill processing. If you need to  your refill at a new pharmacy, please contact the new pharmacy directly. The new pharmacy will help you get your medications transferred.       Financial Assistance [phone]  [employer] Billing [phone]  Central Billing Office, ealth: [phone]  [facility] [phone]  Medical Records [phone]  [facility] Patient Bill of Rights [email or web address]       MENTAL HEALTH CRISIS RESOURCES:  For a emergency help, please call 911 or go to the nearest Emergency Department.     Emergency Walk-In Options:   EmPATH Unit @ [facility] ([place]): [phone] - Specialized mental health emergency area designed to be calming  [facility] ([place]): [phone]  [facility]  ([employer]): [phone]  [facility] ([place]): [phone]    [facility] Crisis Information:   Tessa: [phone]  Nik: [phone]  Ava (HARRIS) - Adult: [phone]     Child: [phone]  Jose L - Adult: [phone]     Child: [phone]  [place]: [phone]  List of all [facility] resources:   https://[place].gov/dhs/people-we-serve/adults/health-care/mental-health/resources/crisis-contacts.jsp    National Crisis Information:   National Suicide & Crisis Lifeline: Call 886        For online chat options, visit https://suicidepreventionlifeline.org/chat/  Poison Control Center: 4-085-422-2558  Poison Control Center: 7-949-685-5051  Trans Lifeline: 1-239.763.4368 - Hotline for transgender people of all ages  [employer]: 5-884-418-7899 - Hotline for LGBT youth     For Non-Emergency Support:   Fast Tracker: Mental Health & Substance Use Disorder Resources -   https://www.fasttrackermn.org/       Again thank you for choosing [facility] and please let us know how we can best partner with you to improve you and your family's health.    You may be receiving a survey regarding this appointment. We would love to have your feedback, both positive and negative. The survey is done by an external company, so your answers are anonymous.     Alert

## 2023-03-13 NOTE — Clinical Note
Please call to schedule patient for follow-up in 2 weeks.  Thank you!   XIOMARA Zeng, HADLEY-BC [facility] ([facility]) [facility]

## 2023-03-13 NOTE — NURSING NOTE
Is the patient currently in the state of [place]? YES    Visit mode:VIDEO    If the visit is dropped, the patient can be reconnected by: VIDEO VISIT: Text to cell phone:   Telephone Information:   Mobile [phone]   Mobile [phone]       Will anyone else be joining the visit? No  (If patient encounters technical issues they should call [phone])    How would you like to obtain your AVS? MyChart    Are changes needed to the allergy or medication list? NO    Rooming Documentation: Patient will complete qnrs in mychart    Reason for visit:  Treatment    YURIDIA Man

## 2023-04-15 ENCOUNTER — HEALTH MAINTENANCE LETTER (OUTPATIENT)
Age: 23
End: 2023-04-15

## 2023-05-12 DIAGNOSIS — F41.1 GAD (GENERALIZED ANXIETY DISORDER): ICD-10-CM

## 2023-05-12 DIAGNOSIS — F41.0 PANIC DISORDER WITHOUT AGORAPHOBIA: ICD-10-CM

## 2023-05-12 RX ORDER — FLUOXETINE 10 MG/1
10 CAPSULE ORAL DAILY
Qty: 90 CAPSULE | Refills: 1 | Status: SHIPPED | OUTPATIENT
Start: 2023-05-12 | End: 2023-09-14

## 2023-05-12 NOTE — TELEPHONE ENCOUNTER
Spoke with pt on phone regarding his current fluoxetine use. He states that he never increased his dosage from 10 mg to 20 mg as he felt like the 10 mg coupled with some lifestyle changes made some improvements. Explained that we would send new prescription at 10 mg dosage and that he could increase to 20 if he felt it necessary but that he would need to be consistent in staying at 20 mg for at least 1 month to monitor for changes. Asked pt to schedule follow-up appt in August when Dr. Payne returns to clinic.    Jorge FRANCE, RN  05/12/23 2:10 PM

## 2023-05-20 ENCOUNTER — APPOINTMENT (OUTPATIENT)
Dept: GENERAL RADIOLOGY | Facility: CLINIC | Age: 23
End: 2023-05-20
Attending: EMERGENCY MEDICINE
Payer: COMMERCIAL

## 2023-05-20 ENCOUNTER — HOSPITAL ENCOUNTER (EMERGENCY)
Facility: CLINIC | Age: 23
Discharge: HOME | End: 2023-05-20
Attending: EMERGENCY MEDICINE | Admitting: EMERGENCY MEDICINE
Payer: COMMERCIAL

## 2023-05-20 VITALS
BODY MASS INDEX: 38.77 KG/M2 | TEMPERATURE: 99.5 F | HEIGHT: 71 IN | HEART RATE: 109 BPM | OXYGEN SATURATION: 97 % | DIASTOLIC BLOOD PRESSURE: 86 MMHG | SYSTOLIC BLOOD PRESSURE: 129 MMHG | RESPIRATION RATE: 18 BRPM

## 2023-05-20 DIAGNOSIS — M25.561 ACUTE PAIN OF RIGHT KNEE: ICD-10-CM

## 2023-05-20 PROCEDURE — 99283 EMERGENCY DEPT VISIT LOW MDM: CPT | Performed by: EMERGENCY MEDICINE

## 2023-05-20 PROCEDURE — 73562 X-RAY EXAM OF KNEE 3: CPT | Mod: RT

## 2023-05-20 ASSESSMENT — ACTIVITIES OF DAILY LIVING (ADL): ADLS_ACUITY_SCORE: 37
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
## 2023-06-20 ENCOUNTER — OFFICE VISIT (OUTPATIENT)
Dept: FAMILY MEDICINE | Facility: CLINIC | Age: 23
End: 2023-06-20
Payer: COMMERCIAL

## 2023-06-20 VITALS
SYSTOLIC BLOOD PRESSURE: 128 MMHG | OXYGEN SATURATION: 100 % | WEIGHT: 294.7 LBS | HEART RATE: 81 BPM | DIASTOLIC BLOOD PRESSURE: 75 MMHG | BODY MASS INDEX: 41.26 KG/M2 | HEIGHT: 71 IN | RESPIRATION RATE: 16 BRPM

## 2023-06-20 DIAGNOSIS — S89.91XA KNEE INJURY, RIGHT, INITIAL ENCOUNTER: Primary | ICD-10-CM

## 2023-06-20 PROCEDURE — 99203 OFFICE O/P NEW LOW 30 MIN: CPT | Performed by: FAMILY MEDICINE

## 2023-06-20 ASSESSMENT — PATIENT HEALTH QUESTIONNAIRE - PHQ9
SUM OF ALL RESPONSES TO PHQ QUESTIONS 1-9: 4
10. IF YOU CHECKED OFF ANY PROBLEMS, HOW DIFFICULT HAVE THESE PROBLEMS MADE IT FOR YOU TO DO YOUR WORK, TAKE CARE OF THINGS AT HOME, OR GET ALONG WITH OTHER PEOPLE: SOMEWHAT DIFFICULT
SUM OF ALL RESPONSES TO PHQ QUESTIONS 1-9: 4
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
## 2023-07-06 ENCOUNTER — OFFICE VISIT (OUTPATIENT)
Dept: ORTHOPEDICS | Facility: CLINIC | Age: 23
End: 2023-07-06
Attending: FAMILY MEDICINE
Payer: COMMERCIAL

## 2023-07-06 VITALS
DIASTOLIC BLOOD PRESSURE: 83 MMHG | WEIGHT: 294 LBS | SYSTOLIC BLOOD PRESSURE: 137 MMHG | HEART RATE: 86 BPM | BODY MASS INDEX: 41 KG/M2

## 2023-07-06 DIAGNOSIS — S89.91XA KNEE INJURY, RIGHT, INITIAL ENCOUNTER: ICD-10-CM

## 2023-07-06 PROCEDURE — 99204 OFFICE O/P NEW MOD 45 MIN: CPT | Performed by: PEDIATRICS

## 2023-07-06 NOTE — LETTER
July 6, 2023      Fredy Mora  [address] [place] [id number]        To Whom It May Concern,     Fredy is under my care for a right knee injury.  He can work with the following restrictions:  - Limit lifting, pushing and pulling > 10 lbs, limit squatting      Follow up will be within 3 weeks.      Sincerely,        Nai Andres MD
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
## 2023-07-06 NOTE — PATIENT INSTRUCTIONS
We discussed these other possible diagnosis: Right Knee injury, concern for ACL tear, recommend MRI to evaluate.    Plan:  - Today's Plan of Care:  MRI of the Right Knee - Call [phone] to schedule MRI     Discussed activity considerations and other supportive care including Ice/Heat, OTC and other topical medications as needed.    Hinged knee brace  Home Exercise Program    Work Letter Given    -We also discussed other future treatment options:  Referral to Orthopedic Surgery  Referral to Physical Therapy    Follow Up: In clinic with Dr. Andres after MRI (wait at least 1-2 days)     If you have any further questions for your physician or physician s care team you can call [phone] and use option 3 to leave a voice message.

## 2023-07-09 ENCOUNTER — HOSPITAL ENCOUNTER (OUTPATIENT)
Dept: MRI IMAGING | Facility: CLINIC | Age: 23
Discharge: HOME | End: 2023-07-09
Attending: PEDIATRICS | Admitting: PEDIATRICS
Payer: COMMERCIAL

## 2023-07-09 DIAGNOSIS — S89.91XA KNEE INJURY, RIGHT, INITIAL ENCOUNTER: ICD-10-CM

## 2023-07-09 PROCEDURE — 73721 MRI JNT OF LWR EXTRE W/O DYE: CPT | Mod: RT

## 2023-07-10 ENCOUNTER — OFFICE VISIT (OUTPATIENT)
Dept: ORTHOPEDICS | Facility: CLINIC | Age: 23
End: 2023-07-10
Payer: COMMERCIAL

## 2023-07-10 VITALS
SYSTOLIC BLOOD PRESSURE: 146 MMHG | WEIGHT: 294 LBS | DIASTOLIC BLOOD PRESSURE: 86 MMHG | BODY MASS INDEX: 41 KG/M2 | HEART RATE: 118 BPM

## 2023-07-10 DIAGNOSIS — S83.8X1A MENISCAL INJURY, RIGHT, INITIAL ENCOUNTER: ICD-10-CM

## 2023-07-10 DIAGNOSIS — S89.91XA KNEE INJURY, RIGHT, INITIAL ENCOUNTER: Primary | ICD-10-CM

## 2023-07-10 DIAGNOSIS — S89.91XA ACUTE INJURY OF RIGHT ANTERIOR CRUCIATE LIGAMENT, INITIAL ENCOUNTER: ICD-10-CM

## 2023-07-10 PROCEDURE — 99213 OFFICE O/P EST LOW 20 MIN: CPT | Performed by: PEDIATRICS

## 2023-07-10 NOTE — LETTER
July 10, 2023      Fredy Mora  [address] [place] [id number]        Fredy is under my care for a right knee injury.  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

## 2023-07-10 NOTE — PATIENT INSTRUCTIONS
We discussed these other possible diagnosis: Right knee injury - with ACL and meniscal injury. Discussed surgical referral next step given injury.    Plan:  - Today's Plan of Care:  Referral to an Orthopedic Surgeon - Dr. Costa  Continue Home Exercise Program  Hinged knee brace, crutches if needed    Continue with relative rest and activity modification, Ice, Compression, and Elevation.  Can apply ice 10-15 minutes 3-4 times per day as needed. OTC medications as needed.    -We also discussed other future treatment options:  Care Coordination referral if needed    Follow Up: With Orthopedic Surgery    If you have any further questions for your physician or physician s care team you can call [phone] and use option 3 to leave a voice message.
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
## 2023-07-11 ENCOUNTER — TELEPHONE (OUTPATIENT)
Dept: ORTHOPEDICS | Facility: CLINIC | Age: 23
End: 2023-07-11
Payer: COMMERCIAL

## 2023-07-11 NOTE — TELEPHONE ENCOUNTER
[facility] Call Center    Phone Message    May a detailed message be left on voicemail: yes     Reason for Call: Other: Patient have a referral for acute injury of right anterior cruciate ligament to be seen in 3-5 days with Dr. Costa, next opening with provider at [facility] is 7/31. Please advise for sooner appt.     Action Taken: Message routed to:  [facility] ([facility]): ortho    Travel Screening: Not Applicable

## 2023-07-24 NOTE — TELEPHONE ENCOUNTER
DIAGNOSIS: Knee injury, right, initial encounter [S89.91XA]  Acute injury of right anterior cruciate ligament, initial encounter [S89.91XA]  Meniscal injury, right, initial encounter \ [facility]\ ortho con    APPOINTMENT DATE: 08/2320/23   NOTES STATUS DETAILS   OFFICE NOTE from referring provider Internal 7/10/23 OV Nai Andres MD    DISCHARGE REPORT from the ER Internal [facility] ED: 5/20/23   MEDICATION LIST Internal    LABS & IMAGING      CBC/DIFF Internal [facility]:  11/18/22   [facility] Imaging  Report: EPIC  Images: PACS  MR Right knee: 7/9/23  XR Right knee: 5/20/23

## 2023-07-31 ENCOUNTER — PRE VISIT (OUTPATIENT)
Dept: ORTHOPEDICS | Facility: CLINIC | Age: 23
End: 2023-07-31

## 2023-09-14 ENCOUNTER — OFFICE VISIT (OUTPATIENT)
Dept: FAMILY MEDICINE | Facility: CLINIC | Age: 23
End: 2023-09-14
Payer: COMMERCIAL

## 2023-09-14 ENCOUNTER — OFFICE VISIT (OUTPATIENT)
Dept: BEHAVIORAL HEALTH | Facility: CLINIC | Age: 23
End: 2023-09-14

## 2023-09-14 VITALS
BODY MASS INDEX: 42.96 KG/M2 | SYSTOLIC BLOOD PRESSURE: 134 MMHG | WEIGHT: 308 LBS | DIASTOLIC BLOOD PRESSURE: 88 MMHG | TEMPERATURE: 98.1 F | OXYGEN SATURATION: 97 % | RESPIRATION RATE: 17 BRPM | HEART RATE: 104 BPM

## 2023-09-14 DIAGNOSIS — F41.1 GAD (GENERALIZED ANXIETY DISORDER): ICD-10-CM

## 2023-09-14 DIAGNOSIS — S83.511D RUPTURE OF ANTERIOR CRUCIATE LIGAMENT OF RIGHT KNEE, SUBSEQUENT ENCOUNTER: ICD-10-CM

## 2023-09-14 DIAGNOSIS — F41.0 PANIC DISORDER WITHOUT AGORAPHOBIA: ICD-10-CM

## 2023-09-14 DIAGNOSIS — S83.231D COMPLEX TEAR OF MEDIAL MENISCUS OF RIGHT KNEE AS CURRENT INJURY, SUBSEQUENT ENCOUNTER: Primary | ICD-10-CM

## 2023-09-14 DIAGNOSIS — F41.1 GAD (GENERALIZED ANXIETY DISORDER): Primary | ICD-10-CM

## 2023-09-14 DIAGNOSIS — F32.1 MODERATE SINGLE CURRENT EPISODE OF MAJOR DEPRESSIVE DISORDER (H): ICD-10-CM
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
## 2023-09-14 NOTE — NURSING NOTE
23 year old  Chief Complaint   Patient presents with    Musculoskeletal Problem     Torn R ACL and meniscus. Was told the next thing to do is surgery -- seeking a second opinion, does not want to do surgery/unable to miss work to have surgery done.        Blood pressure 134/88, pulse 104, temperature 98.1  F (36.7  C), temperature source Temporal, resp. rate 17, weight 139.7 kg (308 lb), SpO2 97 %. Body mass index is 42.96 kg/m .  Patient Active Problem List   Diagnosis    Idiopathic thrombocytopenic purpura (H)    Tobacco abuse    Obesity    Moderate single current episode of major depressive disorder (H)    PARMINDER (generalized anxiety disorder)    Panic disorder without agoraphobia    Knee injury, right, initial encounter       Wt Readings from Last 2 Encounters:   09/14/23 139.7 kg (308 lb)   07/10/23 133.4 kg (294 lb)     BP Readings from Last 3 Encounters:   09/14/23 134/88   07/10/23 (!) 146/86   07/06/23 137/83         Current Outpatient Medications   Medication    FLUoxetine (PROZAC) 10 MG capsule    Ibuprofen (ADVIL PO)    Naproxen Sodium (ALEVE PO)    propranolol (INDERAL) 10 MG tablet     No current facility-administered medications for this visit.       Social History     Tobacco Use    Smoking status: Every Day     Packs/day: 0.50     Years: 5.00     Pack years: 2.50     Types: Cigarettes, Other     Start date: 6/5/2017    Smokeless tobacco: Current   Vaping Use    Vaping Use: Every day    Substances: Nicotine   Substance Use Topics    Alcohol use: Not Currently     Comment: denies any use recently    Drug use: Not Currently     Types: Marijuana       Health Maintenance Due   Topic Date Due    LIPID  Never done    ADVANCE CARE PLANNING  Never done    Pneumococcal Vaccine: Pediatrics (0 to 5 Years) and At-Risk Patients (6 to 64 Years) (1 - PCV) Never done    YEARLY PREVENTIVE VISIT  09/19/2012    HIV SCREENING  Never done    HEPATITIS C SCREENING  Never done    CBC  02/15/2020    COVID-19 Vaccine (2 -  Moderna series) 11/03/2021    INFLUENZA VACCINE (1) 09/01/2023       No results found for: PAP      September 14, 2023 1:30 PM

## 2023-09-14 NOTE — PROGRESS NOTES
Patient had appointment with their primary care physician. [facility] services were offered. No immediate safety/risk issues were reported or identified.  Explained the role of the [facility], answered pt questions, provided contact information, and informed pt how to contact insurance plan for more information about mh benefits.    Pt expressed interest in [facility] services and affirmed ability to schedule appt.

## 2023-09-18 DIAGNOSIS — S89.91XA KNEE INJURY, RIGHT, INITIAL ENCOUNTER: Primary | ICD-10-CM

## 2023-09-21 ENCOUNTER — OFFICE VISIT (OUTPATIENT)
Dept: ORTHOPEDICS | Facility: CLINIC | Age: 23
End: 2023-09-21
Payer: COMMERCIAL

## 2023-09-21 ENCOUNTER — ANCILLARY PROCEDURE (OUTPATIENT)
Dept: GENERAL RADIOLOGY | Facility: CLINIC | Age: 23
End: 2023-09-21
Attending: ORTHOPAEDIC SURGERY
Payer: COMMERCIAL

## 2023-09-21 VITALS — BODY MASS INDEX: 42 KG/M2 | HEIGHT: 71 IN | WEIGHT: 300 LBS

## 2023-09-21 DIAGNOSIS — S83.231D COMPLEX TEAR OF MEDIAL MENISCUS OF RIGHT KNEE AS CURRENT INJURY, SUBSEQUENT ENCOUNTER: ICD-10-CM

## 2023-09-21 DIAGNOSIS — S83.511D RUPTURE OF ANTERIOR CRUCIATE LIGAMENT OF RIGHT KNEE, SUBSEQUENT ENCOUNTER: ICD-10-CM

## 2023-09-21 DIAGNOSIS — S89.91XA KNEE INJURY, RIGHT, INITIAL ENCOUNTER: ICD-10-CM

## 2023-09-21 PROCEDURE — 99204 OFFICE O/P NEW MOD 45 MIN: CPT | Performed by: ORTHOPAEDIC SURGERY

## 2023-09-21 PROCEDURE — 73560 X-RAY EXAM OF KNEE 1 OR 2: CPT | Mod: RT | Performed by: RADIOLOGY

## 2023-09-21 ASSESSMENT — PAIN SCALES - GENERAL: PAINLEVEL: MODERATE PAIN (4)
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
## 2024-02-01 ENCOUNTER — OFFICE VISIT (OUTPATIENT)
Dept: URGENT CARE | Facility: URGENT CARE | Age: 24
End: 2024-02-01
Payer: COMMERCIAL

## 2024-02-01 VITALS
SYSTOLIC BLOOD PRESSURE: 143 MMHG | HEART RATE: 98 BPM | BODY MASS INDEX: 43.65 KG/M2 | RESPIRATION RATE: 16 BRPM | DIASTOLIC BLOOD PRESSURE: 87 MMHG | OXYGEN SATURATION: 98 % | WEIGHT: 313 LBS | TEMPERATURE: 98.6 F

## 2024-02-01 DIAGNOSIS — R07.0 THROAT PAIN: Primary | ICD-10-CM

## 2024-02-01 LAB
DEPRECATED S PYO AG THROAT QL EIA: NEGATIVE
GROUP A STREP BY PCR: NOT DETECTED

## 2024-02-01 PROCEDURE — 99213 OFFICE O/P EST LOW 20 MIN: CPT | Performed by: PHYSICIAN ASSISTANT

## 2024-02-01 PROCEDURE — 87651 STREP A DNA AMP PROBE: CPT | Performed by: PHYSICIAN ASSISTANT

## 2024-02-01 ASSESSMENT — ENCOUNTER SYMPTOMS
DYSURIA: 0
CHEST TIGHTNESS: 0
GASTROINTESTINAL NEGATIVE: 1
WHEEZING: 0
PALPITATIONS: 0
ABDOMINAL PAIN: 0
NAUSEA: 0
ALLERGIC/IMMUNOLOGIC NEGATIVE: 1
DIARRHEA: 0
SHORTNESS OF BREATH: 0
FREQUENCY: 0
COUGH: 0
SORE THROAT: 1
VOMITING: 0
FEVER: 0
NEUROLOGICAL NEGATIVE: 1
CONSTITUTIONAL NEGATIVE: 1
MYALGIAS: 0
CHILLS: 0
HEADACHES: 0
HEMATURIA: 0
RESPIRATORY NEGATIVE: 1
CARDIOVASCULAR NEGATIVE: 1
MUSCULOSKELETAL NEGATIVE: 1

## 2024-02-01 ASSESSMENT — PAIN SCALES - GENERAL: PAINLEVEL: MILD PAIN (2)

## 2024-02-01 NOTE — PROGRESS NOTES
Chief Complaint:     Chief Complaint   Patient presents with    Pharyngitis     Patient reports inconsistent sore throat; feels soreness of left side of throat each morning and evening        No results found for any visits on 02/01/24.    Medical Decision Making:    Vital signs reviewed by Tadeo Jordan PA-C  BP (!) 143/87 (BP Location: Left arm, Patient Position: Sitting, Cuff Size: Adult Large)   Pulse 98   Temp 98.6  F (37  C) (Tympanic)   Resp 16   Wt 142 kg (313 lb)   SpO2 98%   BMI 43.65 kg/m      Differential Diagnosis:  URI Adult/Peds:  Sinusitis, Strep pharyngitis, Tonsilitis, Viral pharyngitis, and Viral syndrome        ASSESSMENT    1. Throat pain        PLAN    Patient is in no acute distress.    Temp is 98.6 in clinic today, lung sounds were clear, and O2 sats at 98% on RA.    RST was negative.  We will call with PCR results only if positive.  Rest, Push fluids, vaporizer, elevation of head of bed.  Ibuprofen and or Tylenol for any fever or body aches.  If symptoms worsen, recheck immediately otherwise follow up with your PCP in 1 week if symptoms are not improving.  Worrisome symptoms discussed with instructions to go to the ED.  Patient verbalized understanding and agreed with this plan.    Labs:    No results found for any visits on 02/01/24.     Vital signs reviewed by Tadeo Jordan PA-C  BP (!) 143/87 (BP Location: Left arm, Patient Position: Sitting, Cuff Size: Adult Large)   Pulse 98   Temp 98.6  F (37  C) (Tympanic)   Resp 16   Wt 142 kg (313 lb)   SpO2 98%   BMI 43.65 kg/m      Current Meds      Current Outpatient Medications:     FLUoxetine (PROZAC) 20 MG capsule, Take 1 capsule (20 mg) by mouth daily, Disp: 90 capsule, Rfl: 1    propranolol (INDERAL) 10 MG tablet, TAKE 1 TABLET (10 MG) BY MOUTH 3 TIMES A DAY IF NEEDED (PALPITATIONS/FAST HEARTRATE), Disp: 60 tablet, Rfl: 1    Ibuprofen (ADVIL PO), Take 400 mg by mouth, Disp: , Rfl:     Naproxen Sodium (ALEVE PO), , Disp: ,  Rfl:       Respiratory History    occasional episodes of bronchitis      SUBJECTIVE    HPI: Fredy Mora is an 23 year old male who presents with sore throat.  Symptoms began 1 week ago and has unchanged.  There is no shortness of breath, wheezing, and chest pain.  Patient is eating and drinking well.  No fever, nausea, vomiting, or diarrhea.    Patient denies any recent travel or exposure to known COVID positive tested individual.      ROS:     Review of Systems   Constitutional: Negative.  Negative for chills and fever.   HENT:  Positive for sore throat.    Respiratory: Negative.  Negative for cough, chest tightness, shortness of breath and wheezing.    Cardiovascular: Negative.  Negative for chest pain and palpitations.   Gastrointestinal: Negative.  Negative for abdominal pain, diarrhea, nausea and vomiting.   Genitourinary:  Negative for dysuria, frequency, hematuria and urgency.   Musculoskeletal: Negative.  Negative for myalgias.   Skin:  Negative for rash.   Allergic/Immunologic: Negative.  Negative for immunocompromised state.   Neurological: Negative.  Negative for headaches.         Family History   No family history on file.     Problem history  Patient Active Problem List   Diagnosis    Idiopathic thrombocytopenic purpura (H)    Tobacco abuse    Obesity    Moderate single current episode of major depressive disorder (H)    PARMINDER (generalized anxiety disorder)    Panic disorder without agoraphobia    Knee injury, right, initial encounter        Allergies  No Known Allergies     Social History  Social History     Socioeconomic History    Marital status: Single     Spouse name: Not on file    Number of children: Not on file    Years of education: Not on file    Highest education level: Not on file   Occupational History    Not on file   Tobacco Use    Smoking status: Every Day     Packs/day: 0.50     Years: 5.00     Additional pack years: 0.00     Total pack years: 2.50     Types: Cigarettes, Other      Start date: 6/5/2017     Passive exposure: Current    Smokeless tobacco: Current   Vaping Use    Vaping Use: Every day    Substances: Nicotine   Substance and Sexual Activity    Alcohol use: Not Currently     Comment: denies any use recently    Drug use: Not Currently     Types: Marijuana    Sexual activity: Never     Partners: Female   Other Topics Concern    Parent/sibling w/ CABG, MI or angioplasty before 65F 55M? No   Social History Narrative    Not on file     Social Determinants of Health     Financial Resource Strain: Not on file   Food Insecurity: Not on file   Transportation Needs: Not on file   Physical Activity: Not on file   Stress: Not on file   Social Connections: Not on file   Interpersonal Safety: Not on file   Housing Stability: Not on file        OBJECTIVE     Vital signs reviewed by Tadeo Jordan PA-C  BP (!) 143/87 (BP Location: Left arm, Patient Position: Sitting, Cuff Size: Adult Large)   Pulse 98   Temp 98.6  F (37  C) (Tympanic)   Resp 16   Wt 142 kg (313 lb)   SpO2 98%   BMI 43.65 kg/m       Physical Exam  Vitals reviewed.   Constitutional:       General: He is not in acute distress.     Appearance: He is well-developed. He is not ill-appearing, toxic-appearing or diaphoretic.   HENT:      Head: Normocephalic and atraumatic.      Right Ear: Hearing, tympanic membrane, ear canal and external ear normal. No drainage, swelling or tenderness. Tympanic membrane is not perforated, erythematous, retracted or bulging.      Left Ear: Hearing, tympanic membrane, ear canal and external ear normal. No drainage, swelling or tenderness. Tympanic membrane is not perforated, erythematous, retracted or bulging.      Nose: Congestion and rhinorrhea present. No nasal tenderness or mucosal edema.      Right Turbinates: Not enlarged or swollen.      Left Turbinates: Not enlarged or swollen.      Right Sinus: No maxillary sinus tenderness or frontal sinus tenderness.      Left Sinus: No maxillary sinus  tenderness or frontal sinus tenderness.      Mouth/Throat:      Pharynx: Posterior oropharyngeal erythema present. No pharyngeal swelling, oropharyngeal exudate or uvula swelling.      Tonsils: No tonsillar exudate. 0 on the right. 0 on the left.   Eyes:      General: Lids are normal.         Right eye: No discharge.         Left eye: No discharge.      Conjunctiva/sclera: Conjunctivae normal.      Right eye: Right conjunctiva is not injected. No exudate.     Left eye: Left conjunctiva is not injected. No exudate.     Pupils: Pupils are equal, round, and reactive to light.   Cardiovascular:      Rate and Rhythm: Normal rate and regular rhythm.      Heart sounds: Normal heart sounds. No murmur heard.     No friction rub. No gallop.   Pulmonary:      Effort: Pulmonary effort is normal. No accessory muscle usage, respiratory distress or retractions.      Breath sounds: Normal breath sounds and air entry. No stridor, decreased air movement or transmitted upper airway sounds. No decreased breath sounds, wheezing, rhonchi or rales.   Chest:      Chest wall: No tenderness.   Abdominal:      General: Bowel sounds are normal. There is no distension.      Palpations: Abdomen is soft. Abdomen is not rigid. There is no mass.      Tenderness: There is no abdominal tenderness. There is no guarding or rebound.   Musculoskeletal:         General: Normal range of motion.      Cervical back: Normal range of motion and neck supple.   Lymphadenopathy:      Head:      Right side of head: No submental, submandibular, tonsillar, preauricular or posterior auricular adenopathy.      Left side of head: No submental, submandibular, tonsillar, preauricular or posterior auricular adenopathy.      Cervical:      Right cervical: No superficial or posterior cervical adenopathy.     Left cervical: No superficial or posterior cervical adenopathy.   Skin:     General: Skin is warm.      Capillary Refill: Capillary refill takes less than 2 seconds.    Neurological:      Mental Status: He is alert and oriented to person, place, and time.      Cranial Nerves: No cranial nerve deficit.      Sensory: No sensory deficit.      Motor: No abnormal muscle tone.      Coordination: Coordination normal.      Deep Tendon Reflexes: Reflexes normal.   Psychiatric:         Behavior: Behavior normal. Behavior is cooperative.         Thought Content: Thought content normal.         Judgment: Judgment normal.           Tadeo Jordan PA-C  2/1/2024, 4:13 PM

## 2024-03-11 ENCOUNTER — MYC REFILL (OUTPATIENT)
Dept: FAMILY MEDICINE | Facility: CLINIC | Age: 24
End: 2024-03-11

## 2024-03-11 DIAGNOSIS — F41.0 PANIC DISORDER WITHOUT AGORAPHOBIA: ICD-10-CM

## 2024-03-11 DIAGNOSIS — F41.1 GAD (GENERALIZED ANXIETY DISORDER): ICD-10-CM

## 2024-03-11 DIAGNOSIS — F32.1 MODERATE SINGLE CURRENT EPISODE OF MAJOR DEPRESSIVE DISORDER (H): ICD-10-CM

## 2024-03-12 NOTE — TELEPHONE ENCOUNTER
Medication requested: FLUoxetine (PROZAC) 20 MG capsule   Last office visit: 9/14/23  [facility] appointments: 3/21/24  Medication last refilled: 9/14/23' 90 + 1 refill  Last qualifying labs:    6/20/2023  11:16 AM   PHQ-9 / PARMINDER-7 Scores 8/2015 to present    PHQ-9 Score MyChart 4 (Minimal depression)       3/12/2023  5:49 PM   PHQ-9 / PARMINDER-7 Scores 8/2015 to present    PARMINDER-7 Score DocFlow 10      Medication is being filled for 1 time refill only due to:  Patient needs to be seen because due for mental health follow-up appt.    Jorge FRANCE, RN  03/12/24 10:26 AM

## 2024-03-20 SDOH — HEALTH STABILITY: PHYSICAL HEALTH: ON AVERAGE, HOW MANY DAYS PER WEEK DO YOU ENGAGE IN MODERATE TO STRENUOUS EXERCISE (LIKE A BRISK WALK)?: 0 DAYS

## 2024-03-20 SDOH — HEALTH STABILITY: PHYSICAL HEALTH: ON AVERAGE, HOW MANY MINUTES DO YOU ENGAGE IN EXERCISE AT THIS LEVEL?: 0 MIN

## 2024-03-20 ASSESSMENT — SOCIAL DETERMINANTS OF HEALTH (SDOH): HOW OFTEN DO YOU GET TOGETHER WITH FRIENDS OR RELATIVES?: ONCE A WEEK

## 2024-03-21 ENCOUNTER — OFFICE VISIT (OUTPATIENT)
Dept: FAMILY MEDICINE | Facility: CLINIC | Age: 24
End: 2024-03-21
Payer: COMMERCIAL

## 2024-03-21 ENCOUNTER — TELEPHONE (OUTPATIENT)
Dept: FAMILY MEDICINE | Facility: CLINIC | Age: 24
End: 2024-03-21

## 2024-03-21 VITALS
WEIGHT: 311 LBS | SYSTOLIC BLOOD PRESSURE: 135 MMHG | TEMPERATURE: 97.3 F | BODY MASS INDEX: 43.54 KG/M2 | HEIGHT: 71 IN | OXYGEN SATURATION: 97 % | HEART RATE: 87 BPM | DIASTOLIC BLOOD PRESSURE: 81 MMHG

## 2024-03-21 DIAGNOSIS — Z11.4 SCREENING FOR HIV (HUMAN IMMUNODEFICIENCY VIRUS): ICD-10-CM

## 2024-03-21 DIAGNOSIS — Z00.00 ROUTINE GENERAL MEDICAL EXAMINATION AT A HEALTH CARE FACILITY: Primary | ICD-10-CM

## 2024-03-21 DIAGNOSIS — Z23 NEED FOR VACCINATION: ICD-10-CM

## 2024-03-21 DIAGNOSIS — F41.0 PANIC DISORDER WITHOUT AGORAPHOBIA: ICD-10-CM

## 2024-03-21 DIAGNOSIS — R10.13 ABDOMINAL PAIN, EPIGASTRIC: ICD-10-CM

## 2024-03-21 DIAGNOSIS — Z13.220 SCREENING FOR LIPID DISORDERS: ICD-10-CM

## 2024-03-21 DIAGNOSIS — D69.3 IDIOPATHIC THROMBOCYTOPENIC PURPURA (H): ICD-10-CM

## 2024-03-21 DIAGNOSIS — K29.00 ACUTE GASTRITIS WITHOUT HEMORRHAGE, UNSPECIFIED GASTRITIS TYPE: ICD-10-CM

## 2024-03-21 DIAGNOSIS — F32.1 MODERATE SINGLE CURRENT EPISODE OF MAJOR DEPRESSIVE DISORDER (H): ICD-10-CM

## 2024-03-21 DIAGNOSIS — Z11.59 NEED FOR HEPATITIS C SCREENING TEST: ICD-10-CM

## 2024-03-21 DIAGNOSIS — Z13.1 SCREENING FOR DIABETES MELLITUS (DM): ICD-10-CM

## 2024-03-21 DIAGNOSIS — F41.1 GAD (GENERALIZED ANXIETY DISORDER): ICD-10-CM

## 2024-03-21 LAB
ERYTHROCYTE [DISTWIDTH] IN BLOOD BY AUTOMATED COUNT: 11.9 % (ref 10–15)
HBA1C MFR BLD: 5.3 % (ref 0–5.6)
HCT VFR BLD AUTO: 49.6 % (ref 40–53)
HGB BLD-MCNC: 16.3 G/DL (ref 13.3–17.7)
MCH RBC QN AUTO: 28.5 PG (ref 26.5–33)
MCHC RBC AUTO-ENTMCNC: 32.9 G/DL (ref 31.5–36.5)
MCV RBC AUTO: 87 FL (ref 78–100)
PLATELET # BLD AUTO: 189 10E3/UL (ref 150–450)
RBC # BLD AUTO: 5.72 10E6/UL (ref 4.4–5.9)
WBC # BLD AUTO: 6.8 10E3/UL (ref 4–11)

## 2024-03-21 PROCEDURE — 83690 ASSAY OF LIPASE: CPT | Performed by: FAMILY MEDICINE

## 2024-03-21 PROCEDURE — 81418 RX METAB GEN SEQ ALYS PNL 6: CPT | Performed by: FAMILY MEDICINE

## 2024-03-21 PROCEDURE — 87389 HIV-1 AG W/HIV-1&-2 AB AG IA: CPT | Performed by: FAMILY MEDICINE

## 2024-03-21 PROCEDURE — 80061 LIPID PANEL: CPT | Performed by: FAMILY MEDICINE

## 2024-03-21 PROCEDURE — 86803 HEPATITIS C AB TEST: CPT | Performed by: FAMILY MEDICINE

## 2024-03-21 PROCEDURE — G0452 MOLECULAR PATHOLOGY INTERPR: HCPCS | Mod: 26 | Performed by: STUDENT IN AN ORGANIZED HEALTH CARE EDUCATION/TRAINING PROGRAM

## 2024-03-21 PROCEDURE — 84443 ASSAY THYROID STIM HORMONE: CPT | Performed by: FAMILY MEDICINE

## 2024-03-21 PROCEDURE — 80053 COMPREHEN METABOLIC PANEL: CPT | Performed by: FAMILY MEDICINE

## 2024-03-21 RX ORDER — FLUOXETINE 40 MG/1
40 CAPSULE ORAL DAILY
Qty: 90 CAPSULE | Refills: 1 | Status: SHIPPED | OUTPATIENT
Start: 2024-03-21 | End: 2024-06-18

## 2024-03-21 ASSESSMENT — PATIENT HEALTH QUESTIONNAIRE - PHQ9
SUM OF ALL RESPONSES TO PHQ QUESTIONS 1-9: 6
SUM OF ALL RESPONSES TO PHQ QUESTIONS 1-9: 6
10. IF YOU CHECKED OFF ANY PROBLEMS, HOW DIFFICULT HAVE THESE PROBLEMS MADE IT FOR YOU TO DO YOUR WORK, TAKE CARE OF THINGS AT HOME, OR GET ALONG WITH OTHER PEOPLE: SOMEWHAT DIFFICULT

## 2024-03-21 NOTE — TELEPHONE ENCOUNTER
Prior Authorization Retail Medication Request    Medication/Dose: Omeprazole (PRILOSEC) 20 MG DR  Diagnosis and ICD code (if different than what is on RX):  Same  New/renewal/insurance change PA/secondary ins. PA:  Previously Tried and Failed:  Same  Rationale:  Same    Insurance   Primary: Same  Insurance ID:  Same    Secondary (if applicable):Same  Insurance ID:  Same    Pharmacy Information (if different than what is on RX)  Name:  Same  Phone:  Same  Fax:Same    This is for a Quantity Limit override.  Plan only covers one pill daily.  Patient takes 1 pill twice daily.    DAYO FerrerN, RN, CCM  RN Care Coordinator  [facility]  03/21/24  2:37 PM  Phone: [phone]

## 2024-03-21 NOTE — PATIENT INSTRUCTIONS
Good to see you today!  We will draw lab tests today. I will contact you in the next 2-3 business days with the results of these labs. INCREASE fluoxetine to 40 mg per day. START TAKING omeprazole 20 mg twice daily. Follow-up in 1 month for medication check, sooner with concerns.     Preventive Care Advice   This is general advice given by our system to help you stay healthy. However, your care team may have specific advice just for you. Please talk to your care team about your preventive care needs.  Nutrition  Eat 5 or more servings of fruits and vegetables each day.  Try wheat bread, brown rice and whole grain pasta (instead of white bread, rice, and pasta).  Get enough calcium and vitamin D. Check the label on foods and aim for 100% of the RDA (recommended daily allowance).  Lifestyle  Exercise at least 150 minutes each week   (30 minutes a day, 5 days a week).  Do muscle strengthening activities 2 days a week. These help control your weight and prevent disease.  No smoking.  Wear sunscreen to prevent skin cancer.  Have a dental exam and cleaning every 6 months.  Yearly exams  See your health care team every year to talk about:  Any changes in your health.  Any medicines your care team has prescribed.  Preventive care, family planning, and ways to prevent chronic diseases.  Shots (vaccines)   HPV shots (up to age 26), if you've never had them before.  Hepatitis B shots (up to age 59), if you've never had them before.  COVID-19 shot: Get this shot when it's due.  Flu shot: Get a flu shot every year.  Tetanus shot: Get a tetanus shot every 10 years.  Pneumococcal, hepatitis A, and RSV shots: Ask your care team if you need these based on your risk.  Shingles shot (for age 50 and up).  General health tests  Diabetes screening:  Starting at age 35, Get screened for diabetes at least every 3 years.  If you are younger than age 35, ask your care team if you should be screened for diabetes.  Cholesterol test: At age 39,  start having a cholesterol test every 5 years, or more often if advised.  Bone density scan (DEXA): At age 50, ask your care team if you should have this scan for osteoporosis (brittle bones).  Hepatitis C: Get tested at least once in your life.  STIs (sexually transmitted infections)  Before age 24: Ask your care team if you should be screened for STIs.  After age 24: Get screened for STIs if you're at risk. You are at risk for STIs (including HIV) if:  You are sexually active with more than one person.  You don't use condoms every time.  You or a partner was diagnosed with a sexually transmitted infection.  If you are at risk for HIV, ask about PrEP medicine to prevent HIV.  Get tested for HIV at least once in your life, whether you are at risk for HIV or not.  Cancer screening tests  Cervical cancer screening: If you have a cervix, begin getting regular cervical cancer screening tests at age 21. Most people who have regular screenings with normal results can stop after age 65. Talk about this with your provider.  Breast cancer scan (mammogram): If you've ever had breasts, begin having regular mammograms starting at age 40. This is a scan to check for breast cancer.  Colon cancer screening: It is important to start screening for colon cancer at age 45.  Have a colonoscopy test every 10 years (or more often if you're at risk) Or, ask your provider about stool tests like a FIT test every year or Cologuard test every 3 years.  To learn more about your testing options, visit: https://www.[employer]/509484.pdf.  For help making a decision, visit: https://bit.ly/ex18256.  Prostate cancer screening test: If you have a prostate and are age 55 to 69, ask your provider if you would benefit from a yearly prostate cancer screening test.  Lung cancer screening: If you are a current or former smoker age 50 to 80, ask your care team if ongoing lung cancer screenings are right for you.  For informational purposes only. Not to  replace the advice of your health care provider. Copyright   2023 [facility]. All rights reserved. Clinically reviewed by the [facility]. [employer] [id number] - REV 01/24.    Learning About Stress  What is stress?     Stress is your body's response to a hard situation. Your body can have a physical, emotional, or mental response. Stress is a fact of life for most people, and it affects everyone differently. What causes stress for you may not be stressful for someone else.  A lot of things can cause stress. You may feel stress when you go on a job interview, take a test, or run a race. This kind of short-term stress is normal and even useful. It can help you if you need to work hard or react quickly. For example, stress can help you finish an important job on time.  Long-term stress is caused by ongoing stressful situations or events. Examples of long-term stress include long-term health problems, ongoing problems at work, or conflicts in your family. Long-term stress can harm your health.  How does stress affect your health?  When you are stressed, your body responds as though you are in danger. It makes hormones that speed up your heart, make you breathe faster, and give you a burst of energy. This is called the fight-or-flight stress response. If the stress is over quickly, your body goes back to normal and no harm is done.  But if stress happens too often or lasts too long, it can have bad effects. Long-term stress can make you more likely to get sick, and it can make symptoms of some diseases worse. If you tense up when you are stressed, you may develop neck, shoulder, or low back pain. Stress is linked to high blood pressure and heart disease.  Stress also harms your emotional health. It can make you tapia, tense, or depressed. Your relationships may suffer, and you may not do well at work or school.  What can you do to manage stress?  You can try these things to help  manage stress:   Do something active. Exercise or activity can help reduce stress. Walking is a great way to get started. Even everyday activities such as housecleaning or yard work can help.  Try yoga or sarah chi. These techniques combine exercise and meditation. You may need some training at first to learn them.  Do something you enjoy. For example, listen to music or go to a movie. Practice your hobby or do volunteer work.  Meditate. This can help you relax, because you are not worrying about what happened before or what may happen in the future.  Do guided imagery. Imagine yourself in any setting that helps you feel calm. You can use online videos, books, or a teacher to guide you.  Do breathing exercises. For example:  From a standing position, bend forward from the waist with your knees slightly bent. Let your arms dangle close to the floor.  Breathe in slowly and deeply as you return to a standing position. Roll up slowly and lift your head last.  Hold your breath for just a few seconds in the standing position.  Breathe out slowly and bend forward from the waist.  Let your feelings out. Talk, laugh, cry, and express anger when you need to. Talking with supportive friends or family, a counselor, or a aysha leader about your feelings is a healthy way to relieve stress. Avoid discussing your feelings with people who make you feel worse.  Write. It may help to write about things that are bothering you. This helps you find out how much stress you feel and what is causing it. When you know this, you can find better ways to cope.  What can you do to prevent stress?  You might try some of these things to help prevent stress:  Manage your time. This helps you find time to do the things you want and need to do.  Get enough sleep. Your body recovers from the stresses of the day while you are sleeping.  Get support. Your family, friends, and community can make a difference in how you experience stress.  Limit your news  "feed. Avoid or limit time on social media or news that may make you feel stressed.  Do something active. Exercise or activity can help reduce stress. Walking is a great way to get started.  Where can you learn more?  Go to https://www.[employer].net/patiented  Enter N032 in the search box to learn more about \"Learning About Stress.\"  Current as of: October 24, 2023               Content Version: 14.0    5132-3575 [employer].   Care instructions adapted under license by your healthcare professional. If you have questions about a medical condition or this instruction, always ask your healthcare professional. [employer] disclaims any warranty or liability for your use of this information.      Learning About Depression Screening  What is depression screening?  Depression screening is a way to see if you have depression symptoms. It may be done by a doctor or counselor. It's often part of a routine checkup. That's because your mental health is just as important as your physical health.  Depression is a mental health condition that affects how you feel, think, and act. You may:  Have less energy.  Lose interest in your daily activities.  Feel sad and grouchy for a long time.  Depression is very common. It affects people of all ages.  Many things can lead to depression. Some people become depressed after they have a stroke or find out they have a major illness like cancer or heart disease. The death of a loved one or a breakup may lead to depression. It can run in families. Most experts believe that a combination of inherited genes and stressful life events can cause it.  What happens during screening?  You may be asked to fill out a form about your depression symptoms. You and the doctor will discuss your answers. The doctor may ask you more questions to learn more about how you think, act, and feel.  What happens after screening?  If you have symptoms of depression, your doctor will talk to " "you about your options.  Doctors usually treat depression with medicines or counseling. Often, combining the two works best. Many people don't get help because they think that they'll get over the depression on their own. But people with depression may not get better unless they get treatment.  The cause of depression is not well understood. There may be many factors involved. But if you have depression, it's not your fault.  A serious symptom of depression is thinking about death or suicide. If you or someone you care about talks about this or about feeling hopeless, get help right away.  It's important to know that depression can be treated. Medicine, counseling, and self-care may help.  Where can you learn more?  Go to https://www.[facility].net/patiented  Enter T185 in the search box to learn more about \"Learning About Depression Screening.\"  Current as of: June 24, 2023               Content Version: 14.0    1812-0137 [employer].   Care instructions adapted under license by your healthcare professional. If you have questions about a medical condition or this instruction, always ask your healthcare professional. [employer] disclaims any warranty or liability for your use of this information.      "

## 2024-03-21 NOTE — NURSING NOTE
"23 year old  Chief Complaint   Patient presents with    Physical     No concerns. Talk about medications.        Blood pressure 135/81, pulse 87, temperature 97.3  F (36.3  C), temperature source Skin, height 1.803 m (5' 11\"), weight 141.1 kg (311 lb), SpO2 97%. Body mass index is 43.38 kg/m .  Patient Active Problem List   Diagnosis    Idiopathic thrombocytopenic purpura (H)    Tobacco abuse    Obesity    Moderate single current episode of major depressive disorder (H)    PARMINDER (generalized anxiety disorder)    Panic disorder without agoraphobia    Knee injury, right, initial encounter       Wt Readings from Last 2 Encounters:   03/21/24 141.1 kg (311 lb)   02/01/24 142 kg (313 lb)     BP Readings from Last 3 Encounters:   03/21/24 135/81   02/01/24 (!) 143/87   09/14/23 134/88         Current Outpatient Medications   Medication    FLUoxetine (PROZAC) 20 MG capsule    Ibuprofen (ADVIL PO)    propranolol (INDERAL) 10 MG tablet    Naproxen Sodium (ALEVE PO)     No current facility-administered medications for this visit.       Social History     Tobacco Use    Smoking status: Every Day     Packs/day: 0.50     Years: 5.00     Additional pack years: 0.00     Total pack years: 2.50     Types: Other, Vaping Device, Cigarettes     Start date: 6/5/2017     Passive exposure: Current    Smokeless tobacco: Current   Vaping Use    Vaping Use: Every day    Substances: Nicotine   Substance Use Topics    Alcohol use: Not Currently     Comment: denies any use recently    Drug use: Not Currently     Types: Marijuana       Health Maintenance Due   Topic Date Due    LIPID  Never done    Pneumococcal Vaccine: Pediatrics (0 to 5 Years) and At-Risk Patients (6 to 64 Years) (1 of 2 - PCV) Never done    YEARLY PREVENTIVE VISIT  09/19/2012    HIV SCREENING  Never done    HEPATITIS C SCREENING  Never done    CBC  02/15/2020    INFLUENZA VACCINE (1) 09/01/2023    COVID-19 Vaccine (2 - 2023-24 season) 09/01/2023       No results found for: " "\"PAP\"      March 21, 2024 11:24 AM   "

## 2024-03-21 NOTE — TELEPHONE ENCOUNTER
PA initiated for 2 pills daily.  Plan only covers 1 Omeprazole daily.  DAYO FerrerN, RN, CCM  RN Care Coordinator  [facility]  03/21/24  2:38 PM  Phone: [phone]

## 2024-03-21 NOTE — PROGRESS NOTES
Preventive Care Visit  [facility]  Airam Payne MD, Family Medicine  Mar 21, 2024      Assessment & Plan     Fredy was seen today for physical.    Diagnoses and all orders for this visit:    Routine general medical examination at a health care facility    Screening for HIV (human immunodeficiency virus)  -     HIV Screening; Future    Need for hepatitis C screening test  -     Hepatitis C Screen Reflex to HCV RNA Quant and Genotype; Future    Idiopathic thrombocytopenic purpura (H)  -     CBC with Platelets; Future    Screening for lipid disorders  -     Lipid panel reflex to direct LDL Non-fasting; Future    Abdominal pain, epigastric  -     CBC with Platelets; Future  -     Comprehensive metabolic panel; Future  -     Lipase; Future    Acute gastritis without hemorrhage, unspecified gastritis type  -     omeprazole (PRILOSEC) 20 MG DR capsule; Take 1 capsule (20 mg) by mouth 2 times daily    No red flag symptoms. Suspect gastritis although ulcer, pancreatitis, gallbladder disease, IBS also on differential. Will trial PPI.    PARMINDER (generalized anxiety disorder)  -     Pharmacogenomics profile; Future  -     FLUoxetine (PROZAC) 40 MG capsule; Take 1 capsule (40 mg) by mouth daily    Moderate single current episode of major depressive disorder (H)  -     Pharmacogenomics profile; Future  -     FLUoxetine (PROZAC) 40 MG capsule; Take 1 capsule (40 mg) by mouth daily    Panic disorder without agoraphobia  -     TSH with free T4 reflex; Future  -     Pharmacogenomics profile; Future  -     FLUoxetine (PROZAC) 40 MG capsule; Take 1 capsule (40 mg) by mouth daily    Patient requesting [employer] testing - overall has a lot of anxiety about medications and would prefer to know the ones selected are likely to work for him. Discussed may not be covered by insurance and out of pocket costs. Patient agrees to collecting sample today    Screening for diabetes mellitus (DM)  -     Hemoglobin A1c;  "Future        Counseling  Appropriate preventive services were discussed with this patient, including applicable screening as appropriate for fall prevention, nutrition, physical activity, Tobacco-use cessation, weight loss and cognition.  Checklist reviewing preventive services available has been given to the patient.  Reviewed patient's diet, addressing concerns and/or questions.   The patient's PHQ-9 score is consistent with mild depression. He was provided with information regarding depression.         Return in about 4 weeks (around 4/18/2024) for Med check.    Naomi Daniel is a 23 year old, presenting for the following:  Physical (No concerns. Med check. Stomach issues. )        HPI    Mental health  Had a week off work last week, thought this would help but didn't  Anxiety has been higher/increased. Some depression initially  No specific triggers identified. This week has been especially bad  Fluoxetine - taking regularly. Feels that there was some improvement after starting it, but not sure if this was from the medication  Therapy - seeing through [facility]. Seeing every other week. Sometimes weekly. Aware of worsening anxiety    Stomach issues  Felt like he was getting numbness/weakness in the arms  Having inconsistent bowel movements, more diarrhea. Sometimes having pain/cramping. Upset stomach in the morning. Has liquid BM in the mornings, more normal later in the day  Eating has been off - appetite was low last week. Only eating 2-8PM.  Diet is not great - trying to drink probiotics drinks. Tried fiber one gummies. Looking into trying more prebiotics  Not really having pain - stomach doesn't feel good in the mornings.  Large meals tend to upset stomach. \"Indigestion\" sensation.   Rarely gets pain at night.   Some acid reflux symptoms - Tums as needed. Not taking any other medications.  No recent weight loss  No blood in the stool.  No vomiting    Went away  Gets headaches in the back of " head/base of neck. Sometimes pressure as well. Sometimes feels like there is pressure in the back of the head in general. Coincides some with anxiety. Headaches have been chronic, pressure is new.        3/12/2023     5:39 PM 6/20/2023    11:16 AM 3/21/2024    11:17 AM   PHQ   PHQ-9 Total Score 6 4 6   Q9: Thoughts of better off dead/self-harm past 2 weeks Not at all Not at all Not at all           11/25/2022     2:14 PM 12/30/2022     2:47 PM 3/12/2023     5:49 PM   PARMINDER-7 SCORE   Total Score   10 (moderate anxiety)   Total Score 14 16 10           3/20/2024   General Health   How would you rate your overall physical health? (!) POOR   Feel stress (tense, anxious, or unable to sleep) Very much   (!) STRESS CONCERN      3/20/2024   Nutrition   Three or more servings of calcium each day? (!) NO   Diet: Regular (no restrictions)   How many servings of fruit and vegetables per day? (!) 0-1   How many sweetened beverages each day? 0-1         3/20/2024   Exercise   Days per week of moderate/strenous exercise 0 days   Average minutes spent exercising at this level 0 min   (!) EXERCISE CONCERN      3/20/2024   Social Factors   Frequency of gathering with friends or relatives Once a week   Worry food won't last until get money to buy more No   Food not last or not have enough money for food? No   Do you have housing?  Yes   Are you worried about losing your housing? No   Lack of transportation? No   Unable to get utilities (heat,electricity)? No         3/20/2024   Dental   Dentist two times every year? Yes         3/20/2024   TB Screening   Were you born outside of the [place]? No       Today's PHQ-9 Score:       3/21/2024    11:17 AM   PHQ-9 SCORE   PHQ-9 Total Score MyChart 6 (Mild depression)   PHQ-9 Total Score 6         3/20/2024   Substance Use   Alcohol more than 3/day or more than 7/wk No   Do you use any other substances recreationally? No     Social History     Tobacco Use    Smoking status: Every Day     Packs/day:  "0.50     Years: 5.00     Additional pack years: 0.00     Total pack years: 2.50     Types: Other, Vaping Device, Cigarettes     Start date: 6/5/2017     Passive exposure: Current    Smokeless tobacco: Current   Vaping Use    Vaping Use: Every day    Substances: Nicotine   Substance Use Topics    Alcohol use: Not Currently     Comment: denies any use recently    Drug use: Not Currently     Types: Marijuana           3/20/2024   One time HIV Screening   Previous HIV test? No         3/20/2024   STI Screening   New sexual partner(s) since last STI/HIV test? No         3/20/2024   Contraception/Family Planning   Questions about contraception or family planning No     Reviewed and updated as needed this visit by Provider   Tobacco  Allergies  Meds  Problems  Med Hx  Surg Hx  Fam Hx  Soc   Hx Sexual Activity             Objective    Exam  /81 (BP Location: Left arm, Patient Position: Sitting, Cuff Size: Adult Large)   Pulse 87   Temp 97.3  F (36.3  C) (Skin)   Ht 1.803 m (5' 11\")   Wt 141.1 kg (311 lb)   SpO2 97%   BMI 43.38 kg/m     Estimated body mass index is 43.38 kg/m  as calculated from the following:    Height as of this encounter: 1.803 m (5' 11\").    Weight as of this encounter: 141.1 kg (311 lb).    Physical Exam  GENERAL: alert and no distress  EYES: Eyes grossly normal to inspection, PERRL and conjunctivae and sclerae normal  HENT: ear canals and TM's normal, nose and mouth without ulcers or lesions  NECK: no adenopathy, no asymmetry, masses, or scars  RESP: lungs clear to auscultation - no rales, rhonchi or wheezes  CV: regular rate and rhythm, normal S1 S2, no S3 or S4, no murmur, click or rub, no peripheral edema  ABDOMEN: soft, nontender, no hepatosplenomegaly, no masses and bowel sounds normal  MS: no gross musculoskeletal defects noted, no edema  SKIN: no suspicious lesions or rashes  NEURO: Normal strength and tone, mentation intact and speech normal  PSYCH: mentation appears normal, " affect normal/bright        Signed Electronically by: Airam Payne MD

## 2024-03-22 LAB
ALBUMIN SERPL BCG-MCNC: 4.6 G/DL (ref 3.5–5.2)
ALP SERPL-CCNC: 83 U/L (ref 40–150)
ALT SERPL W P-5'-P-CCNC: 39 U/L (ref 0–70)
ANION GAP SERPL CALCULATED.3IONS-SCNC: 12 MMOL/L (ref 7–15)
AST SERPL W P-5'-P-CCNC: 22 U/L (ref 0–45)
BILIRUB SERPL-MCNC: 0.3 MG/DL
BUN SERPL-MCNC: 14.2 MG/DL (ref 6–20)
CALCIUM SERPL-MCNC: 9.9 MG/DL (ref 8.6–10)
CHLORIDE SERPL-SCNC: 105 MMOL/L (ref 98–107)
CHOLEST SERPL-MCNC: 197 MG/DL
CREAT SERPL-MCNC: 0.85 MG/DL (ref 0.67–1.17)
DEPRECATED HCO3 PLAS-SCNC: 25 MMOL/L (ref 22–29)
EGFRCR SERPLBLD CKD-EPI 2021: >90 ML/MIN/1.73M2
FASTING STATUS PATIENT QL REPORTED: YES
GLUCOSE SERPL-MCNC: 86 MG/DL (ref 70–99)
HCV AB SERPL QL IA: NONREACTIVE
HDLC SERPL-MCNC: 32 MG/DL
HIV 1+2 AB+HIV1 P24 AG SERPL QL IA: NONREACTIVE
LDLC SERPL CALC-MCNC: 143 MG/DL
LIPASE SERPL-CCNC: 17 U/L (ref 13–60)
NONHDLC SERPL-MCNC: 165 MG/DL
POTASSIUM SERPL-SCNC: 4.1 MMOL/L (ref 3.4–5.3)
PROT SERPL-MCNC: 7.5 G/DL (ref 6.4–8.3)
SODIUM SERPL-SCNC: 142 MMOL/L (ref 135–145)
TRIGL SERPL-MCNC: 112 MG/DL
TSH SERPL DL<=0.005 MIU/L-ACNC: 1.43 UIU/ML (ref 0.3–4.2)

## 2024-03-28 LAB
GO4PGX COMMENTS: NORMAL
GO4PGX DISCLAIMER: NORMAL
GO4PGX LIMITATIONS: NORMAL
GO4PGX METHODOLOGY: NORMAL
LAB DIRECTOR RESULTS: NORMAL
LOCATION OF TASK: NORMAL
PGX ABOUT THE TEST: NORMAL
PGX VARIANTS: NORMAL

## 2024-04-02 NOTE — TELEPHONE ENCOUNTER
PA Initiation    Medication: OMEPRAZOLE 20 MG PO CPDR  Insurance Company: [employer] - Phone [phone] Fax [phone]  Pharmacy Filling the Rx: [id number] [place] [facility] [place] - [address] [place] [address]  Filling Pharmacy Phone:    Filling Pharmacy Fax:    Start Date: 4/2/2024

## 2024-04-03 NOTE — TELEPHONE ENCOUNTER
Prior Authorization Approval    Medication: OMEPRAZOLE 20 MG PO CPDR  Authorization Effective Date: 3/4/2024  Authorization Expiration Date: 4/3/2025  Approved Dose/Quantity: UD  Reference #:     Insurance Company: [employer] - Phone [phone] Fax [phone]  Expected CoPay: $19.07    CoPay Card Available: No    Financial Assistance Needed: N/A  Which Pharmacy is filling the prescription: [id number] [place] [facility] [place] [address]  Pharmacy Notified: YES  Patient Notified: NO

## 2024-06-18 ENCOUNTER — OFFICE VISIT (OUTPATIENT)
Dept: FAMILY MEDICINE | Facility: CLINIC | Age: 24
End: 2024-06-18
Payer: COMMERCIAL

## 2024-06-18 VITALS
DIASTOLIC BLOOD PRESSURE: 78 MMHG | WEIGHT: 302.5 LBS | OXYGEN SATURATION: 98 % | HEART RATE: 107 BPM | BODY MASS INDEX: 42.19 KG/M2 | SYSTOLIC BLOOD PRESSURE: 123 MMHG | RESPIRATION RATE: 16 BRPM | TEMPERATURE: 96.3 F

## 2024-06-18 DIAGNOSIS — F32.1 MODERATE SINGLE CURRENT EPISODE OF MAJOR DEPRESSIVE DISORDER (H): ICD-10-CM

## 2024-06-18 DIAGNOSIS — F41.0 PANIC DISORDER WITHOUT AGORAPHOBIA: ICD-10-CM

## 2024-06-18 DIAGNOSIS — K29.00 ACUTE GASTRITIS WITHOUT HEMORRHAGE, UNSPECIFIED GASTRITIS TYPE: ICD-10-CM

## 2024-06-18 DIAGNOSIS — F41.1 GAD (GENERALIZED ANXIETY DISORDER): Primary | ICD-10-CM

## 2024-06-18 RX ORDER — PANTOPRAZOLE SODIUM 40 MG/1
40 TABLET, DELAYED RELEASE ORAL DAILY
Qty: 90 TABLET | Refills: 0 | Status: SHIPPED | OUTPATIENT
Start: 2024-06-18

## 2024-06-18 ASSESSMENT — ANXIETY QUESTIONNAIRES
7. FEELING AFRAID AS IF SOMETHING AWFUL MIGHT HAPPEN: SEVERAL DAYS
IF YOU CHECKED OFF ANY PROBLEMS ON THIS QUESTIONNAIRE, HOW DIFFICULT HAVE THESE PROBLEMS MADE IT FOR YOU TO DO YOUR WORK, TAKE CARE OF THINGS AT HOME, OR GET ALONG WITH OTHER PEOPLE: SOMEWHAT DIFFICULT
5. BEING SO RESTLESS THAT IT IS HARD TO SIT STILL: NOT AT ALL
GAD7 TOTAL SCORE: 9
4. TROUBLE RELAXING: SEVERAL DAYS
1. FEELING NERVOUS, ANXIOUS, OR ON EDGE: NEARLY EVERY DAY
GAD7 TOTAL SCORE: 9
7. FEELING AFRAID AS IF SOMETHING AWFUL MIGHT HAPPEN: SEVERAL DAYS
6. BECOMING EASILY ANNOYED OR IRRITABLE: NOT AT ALL
8. IF YOU CHECKED OFF ANY PROBLEMS, HOW DIFFICULT HAVE THESE MADE IT FOR YOU TO DO YOUR WORK, TAKE CARE OF THINGS AT HOME, OR GET ALONG WITH OTHER PEOPLE?: SOMEWHAT DIFFICULT
2. NOT BEING ABLE TO STOP OR CONTROL WORRYING: MORE THAN HALF THE DAYS
3. WORRYING TOO MUCH ABOUT DIFFERENT THINGS: MORE THAN HALF THE DAYS
GAD7 TOTAL SCORE: 9

## 2024-06-18 NOTE — NURSING NOTE
23 year old  Chief Complaint   Patient presents with    Recheck Medication       Blood pressure 123/78, pulse 107, temperature (!) 96.3  F (35.7  C), temperature source Temporal, resp. rate 16, weight 137.2 kg (302 lb 8 oz), SpO2 98%. Body mass index is 42.19 kg/m .  Patient Active Problem List   Diagnosis    Idiopathic thrombocytopenic purpura (H)    Tobacco abuse    Obesity    Moderate single current episode of major depressive disorder (H)    PARMINDER (generalized anxiety disorder)    Panic disorder without agoraphobia    Knee injury, right, initial encounter       Wt Readings from Last 2 Encounters:   06/18/24 137.2 kg (302 lb 8 oz)   03/21/24 141.1 kg (311 lb)     BP Readings from Last 3 Encounters:   06/18/24 123/78   03/21/24 135/81   02/01/24 (!) 143/87         Current Outpatient Medications   Medication Sig Dispense Refill    FLUoxetine (PROZAC) 40 MG capsule Take 1 capsule (40 mg) by mouth daily 90 capsule 1    Ibuprofen (ADVIL PO) Take 400 mg by mouth      omeprazole (PRILOSEC) 20 MG DR capsule Take 1 capsule (20 mg) by mouth 2 times daily 180 capsule 1    propranolol (INDERAL) 10 MG tablet TAKE 1 TABLET (10 MG) BY MOUTH 3 TIMES A DAY IF NEEDED (PALPITATIONS/FAST HEARTRATE) 60 tablet 1     No current facility-administered medications for this visit.       Social History     Tobacco Use    Smoking status: Every Day     Current packs/day: 0.50     Average packs/day: 0.5 packs/day for 7.0 years (3.5 ttl pk-yrs)     Types: Other, Vaping Device, Cigarettes     Start date: 6/5/2017     Passive exposure: Current    Smokeless tobacco: Current   Vaping Use    Vaping status: Every Day    Substances: Nicotine   Substance Use Topics    Alcohol use: Not Currently     Comment: denies any use recently    Drug use: Not Currently     Types: Marijuana       Health Maintenance Due   Topic Date Due    Pneumococcal Vaccine: Pediatrics (0 to 5 Years) and At-Risk Patients (6 to 64 Years) (1 of 2 - PCV) Never done    COVID-19 Vaccine  "(2 - 2023-24 season) 09/01/2023    NICOTINE/TOBACCO CESSATION COUNSELING Q 1 YR  06/20/2024       No results found for: \"PAP\"      June 18, 2024 3:11 PM    "

## 2024-06-18 NOTE — PATIENT INSTRUCTIONS
INCREASE fluoxetine to 60 mg per day (3 x 20 mg)    START pantoprazole 40 mg daily  Take in the morning on an empty stomach for 30-60 minutes.    Follow-up with me in 3 months

## 2024-06-18 NOTE — PROGRESS NOTES
Assessment & Plan     Fredy was seen today for recheck medication.    Diagnoses and all orders for this visit:    PARMINDER (generalized anxiety disorder)  -     FLUoxetine (PROZAC) 20 MG capsule; Take 3 capsules (60 mg) by mouth daily    Acute gastritis without hemorrhage, unspecified gastritis type  -     pantoprazole (PROTONIX) 40 MG EC tablet; Take 1 tablet (40 mg) by mouth daily    Panic disorder without agoraphobia  -     FLUoxetine (PROZAC) 20 MG capsule; Take 3 capsules (60 mg) by mouth daily    Moderate single current episode of major depressive disorder (H)  -     FLUoxetine (PROZAC) 20 MG capsule; Take 3 capsules (60 mg) by mouth daily      Still with significant anxiety, [facility] anxiety  Increase fluoxetine to 60 mg daily. Continue to work with therapist.   [employer] testing results reviewed - does not have any gene-drug interaction with tested SSRIs    Change omeprazole to pantoprazole for coverage for persistent gastritis symptoms.    Declines pneumonia vaccine today    Follow-up in 3 months, sooner with concerns.      Nicotine/Tobacco Cessation  He reports that he has been smoking other, vaping device, and cigarettes. He started smoking about 7 years ago. He has a 3.5 pack-year smoking history. He has been exposed to tobacco smoke. He uses smokeless tobacco.  Nicotine/Tobacco Cessation Plan  Information offered: Patient not interested at this time      Subjective   Fredy is a 23 year old, presenting for the following health issues:  Recheck Medication    HPI     Not sure the fluoxetine is doing a lot  Symptoms are a bit better, but some of it may have been coping strategies  Anxiety fluctuates. More about how he's feeling physically - has health related anxiety.  Remembering to take daily. Has missed 1-2 times, but otherwise consistent.  No significant side effect concerns    Has a therapist - has been working with this person every other week    Stomach/GI symptoms  Back and forth  Wasn't able to pick  up the omeprazole - not covered by insurance or unable to fill at pharmacy  AM - half days has some indigestion feeling like reflux. Usually can't eat/not hungry until 2-3PM. Food doesn't sound appealing prior to this. Does find eating smaller meals is beneficial. Gets heartburn easily from things. Frequent, looser bowel movements.          6/20/2023    11:16 AM 3/21/2024    11:17 AM 6/18/2024     2:59 PM   PHQ   PHQ-9 Total Score 4 6 6   Q9: Thoughts of better off dead/self-harm past 2 weeks Not at all Not at all Not at all         12/30/2022     2:47 PM 3/12/2023     5:49 PM 6/18/2024     3:00 PM   PARMINDER-7 SCORE   Total Score  10 (moderate anxiety) 9 (mild anxiety)   Total Score 16 10 9                         Objective    /78 (BP Location: Left arm, Patient Position: Sitting, Cuff Size: Adult Large)   Pulse 107   Temp (!) 96.3  F (35.7  C) (Temporal)   Resp 16   Wt 137.2 kg (302 lb 8 oz)   SpO2 98%   BMI 42.19 kg/m    Body mass index is 42.19 kg/m .  Physical Exam   General: Well-appearing in NAD   HEENT: Normocephalic, atraumatic. Mucus membranes moist.   Resp: No respiratory distress   MSK: No peripheral edema.   Skin: No rashes or lesions noted.   Psych: Appropriate affect. Mood is good       Signed Electronically by: Airam Payne MD

## 2024-07-08 ENCOUNTER — OFFICE VISIT (OUTPATIENT)
Dept: URGENT CARE | Facility: URGENT CARE | Age: 24
End: 2024-07-08
Payer: COMMERCIAL

## 2024-07-08 VITALS
RESPIRATION RATE: 18 BRPM | BODY MASS INDEX: 42.12 KG/M2 | HEART RATE: 101 BPM | DIASTOLIC BLOOD PRESSURE: 86 MMHG | WEIGHT: 302 LBS | TEMPERATURE: 97.7 F | OXYGEN SATURATION: 97 % | SYSTOLIC BLOOD PRESSURE: 128 MMHG

## 2024-07-08 DIAGNOSIS — R10.13 ABDOMINAL PAIN, EPIGASTRIC: Primary | ICD-10-CM

## 2024-07-08 PROCEDURE — 99213 OFFICE O/P EST LOW 20 MIN: CPT

## 2024-07-08 NOTE — LETTER
July 8, 2024      Fredy Mora  [address] [place] [id number]        To Whom It May Concern:    Fredy Mora was seen on 7/8.  Please excuse him until 7/9 due to illness.        Sincerely,        Kerry Grant MD

## 2024-07-08 NOTE — PROGRESS NOTES
"Assessment & Plan  Fredy was seen today for gastric problem.    Diagnoses and all orders for this visit:    Abdominal pain, epigastric  Acute on chronic epigastric pain worsening over the past 2 weeks. Work-up and management started as an outpatient, he picked up pantoprazole but has not started it yet. Significant abdominal discomfort with eating anything, though has not eaten yet today and is still burping, has heartburn. Gets heart palpitations when he does eat. Nonfocal examination today, reassuring vital signs. Discussed differential diagnosis: peptic ulcer, IBS, GERD, anxiety. No indication for labwork or imaging today, did discuss obtaining H. Pylori test since he has not yet started PPI. Discussed other treatments and work-up for gastric ulcer - recommended starting the pantoprazole and following up with PCP for ongoing management. Reassurance provided.   -     Helicobacter pylori Antigen Stool; Future    Kerry Grant MD  [facility]    Subjective   Fredy is a 24 year old, presenting for the following health issues:  Gastric Problem (Indigestion, uncomfortable when eating, and acid reflex for about 2 week, worst the last week.)    Longstanding history of stomach issues  For the past 2 weeks has felt different  When eating - will get indigestion - feels like body isn't digesting  Hasn't eaten anything today (2:30 PM)  Gets a lot of burping, heartburn - has heartburn right now and hasn't even eaten anything  Diarrhea - less so now that he is eating less, but still about once a day longstanding, no change in pain with bowel movement  Discomfort in epigastric region mainly    Had been sent omeprazole - insurance didn't cover - picked up pantoprazole but didn't start  \"It takes me awhile to warm up to new medications\"    No blood in poop, no black poop  No family history of GI issues or heart disease  Hasn't thrown up, but does feel nauseous  Taking tums \"religiously\" - helps with the stomach " acid feeling but not the pain  Feels lower energy since not eating much - struggling at work  Hasn't eaten any unusual foods lately    Objective    /86 (BP Location: Left arm, Patient Position: Sitting, Cuff Size: Adult Large)   Pulse 101   Temp 97.7  F (36.5  C) (Tympanic)   Resp 18   Wt 137 kg (302 lb)   SpO2 97%   BMI 42.12 kg/m    Body mass index is 42.12 kg/m .  GEN: Patient sitting comfortably in no acute distress.  HEEN: Head is atraumatic, normocephalic, eyes anicteric, mucous membranes moist.  CV: Slightly tachycardic, regular rhythm without obvious murmurs.  PULM: Clear to auscultation bilaterally without wheezing or rales.  ABD: Soft, nontender, bowel sounds present.  NEURO: Alert and oriented x3.  No focal motor abnormalities.  Face symmetric.  PSYCH: Anxious affect.   SKIN: No rashes, bruising, or other lesions.

## 2024-09-14 DIAGNOSIS — K29.00 ACUTE GASTRITIS WITHOUT HEMORRHAGE, UNSPECIFIED GASTRITIS TYPE: ICD-10-CM

## 2024-09-16 RX ORDER — PANTOPRAZOLE SODIUM 40 MG/1
40 TABLET, DELAYED RELEASE ORAL DAILY
Qty: 90 TABLET | Refills: 0 | OUTPATIENT
Start: 2024-09-16

## 2025-01-30 DIAGNOSIS — F32.1 MODERATE SINGLE CURRENT EPISODE OF MAJOR DEPRESSIVE DISORDER (H): ICD-10-CM

## 2025-01-30 DIAGNOSIS — F41.0 PANIC DISORDER WITHOUT AGORAPHOBIA: ICD-10-CM

## 2025-01-30 DIAGNOSIS — F41.1 GAD (GENERALIZED ANXIETY DISORDER): ICD-10-CM

## 2025-01-30 NOTE — TELEPHONE ENCOUNTER
Medication requested: FLUoxetine (PROZAC) 20 MG capsule   Last office visit: 6/18/24  [facility] appointments: none  Medication last refilled: 6/18/24; 270 + 1 refill  Last qualifying labs:    6/18/2024  3:00 PM   PHQ-9 / PARMINDER-7 Scores 8/2015 to present    PARMINDER-7 Score DocFlow 9       6/18/2024  2:59 PM   PHQ-9 / PARMINDER-7 Scores 8/2015 to present    PHQ-9 Score DocFlow 6      Prescription approved per [facility] Refill Protocol.    Jorge FRANCE, RN  01/30/25 9:16 AM

## 2025-04-15 ENCOUNTER — TELEPHONE (OUTPATIENT)
Dept: FAMILY MEDICINE | Facility: CLINIC | Age: 25
End: 2025-04-15

## 2025-04-15 NOTE — TELEPHONE ENCOUNTER
Federica message sent to Fredy requesting he call to schedule an appointment with Dr. Payne to have this form completed.  He has NOT seen Dr. Payne regarding this form and is non-compliant with his follow-up with Dr. Payne.  It's almost been 1 year since last seen and was supposed to be following up every 3 months.  DAYO FerrerN, RN, [facility]  RN Care Coordinator  [facility]  04/15/25  2:42 PM  Phone: [phone]

## 2025-04-15 NOTE — TELEPHONE ENCOUNTER
Form or Letter Request    Type or form/letter needing completion: Mental Health Prescriptions  Provider: Kerry  Has provider seen patient for office visit related to reason for form request? Yes  Date form needed: As soon as possible  Location of form: Forms Bin  Once completed: Fax form to: [phone]  Requesting call back once sent.     Please refer to [facility] Primary Care Services Line Forms Table to see who is responsible for completing

## 2025-04-22 NOTE — TELEPHONE ENCOUNTER
Appt scheduled for 4/24/25.  DAYO FerrerN, RN, CCM  RN Care Coordinator  [facility]  04/22/25  6:23 PM  Phone: [phone]

## 2025-04-24 ENCOUNTER — OFFICE VISIT (OUTPATIENT)
Dept: FAMILY MEDICINE | Facility: CLINIC | Age: 25
End: 2025-04-24
Payer: COMMERCIAL

## 2025-04-24 VITALS
HEART RATE: 91 BPM | DIASTOLIC BLOOD PRESSURE: 74 MMHG | RESPIRATION RATE: 16 BRPM | SYSTOLIC BLOOD PRESSURE: 116 MMHG | OXYGEN SATURATION: 98 % | TEMPERATURE: 98.1 F

## 2025-04-24 DIAGNOSIS — F41.0 PANIC DISORDER WITHOUT AGORAPHOBIA: ICD-10-CM

## 2025-04-24 DIAGNOSIS — F32.1 MODERATE SINGLE CURRENT EPISODE OF MAJOR DEPRESSIVE DISORDER (H): ICD-10-CM

## 2025-04-24 DIAGNOSIS — F41.1 GAD (GENERALIZED ANXIETY DISORDER): ICD-10-CM

## 2025-04-24 RX ORDER — BUPROPION HYDROCHLORIDE 150 MG/1
150 TABLET ORAL EVERY MORNING
Qty: 90 TABLET | Refills: 0 | Status: SHIPPED | OUTPATIENT
Start: 2025-04-24

## 2025-04-24 ASSESSMENT — ANXIETY QUESTIONNAIRES
7. FEELING AFRAID AS IF SOMETHING AWFUL MIGHT HAPPEN: SEVERAL DAYS
GAD7 TOTAL SCORE: 6
6. BECOMING EASILY ANNOYED OR IRRITABLE: SEVERAL DAYS
GAD7 TOTAL SCORE: 6
7. FEELING AFRAID AS IF SOMETHING AWFUL MIGHT HAPPEN: SEVERAL DAYS
IF YOU CHECKED OFF ANY PROBLEMS ON THIS QUESTIONNAIRE, HOW DIFFICULT HAVE THESE PROBLEMS MADE IT FOR YOU TO DO YOUR WORK, TAKE CARE OF THINGS AT HOME, OR GET ALONG WITH OTHER PEOPLE: SOMEWHAT DIFFICULT
2. NOT BEING ABLE TO STOP OR CONTROL WORRYING: SEVERAL DAYS
5. BEING SO RESTLESS THAT IT IS HARD TO SIT STILL: NOT AT ALL
1. FEELING NERVOUS, ANXIOUS, OR ON EDGE: SEVERAL DAYS
GAD7 TOTAL SCORE: 6
4. TROUBLE RELAXING: SEVERAL DAYS
3. WORRYING TOO MUCH ABOUT DIFFERENT THINGS: SEVERAL DAYS
8. IF YOU CHECKED OFF ANY PROBLEMS, HOW DIFFICULT HAVE THESE MADE IT FOR YOU TO DO YOUR WORK, TAKE CARE OF THINGS AT HOME, OR GET ALONG WITH OTHER PEOPLE?: SOMEWHAT DIFFICULT

## 2025-04-24 ASSESSMENT — PATIENT HEALTH QUESTIONNAIRE - PHQ9
10. IF YOU CHECKED OFF ANY PROBLEMS, HOW DIFFICULT HAVE THESE PROBLEMS MADE IT FOR YOU TO DO YOUR WORK, TAKE CARE OF THINGS AT HOME, OR GET ALONG WITH OTHER PEOPLE: SOMEWHAT DIFFICULT
SUM OF ALL RESPONSES TO PHQ QUESTIONS 1-9: 7
SUM OF ALL RESPONSES TO PHQ QUESTIONS 1-9: 7

## 2025-04-24 NOTE — NURSING NOTE
24 year old  Chief Complaint   Patient presents with    Recheck Medication    Form Request       Blood pressure 116/74, pulse 91, temperature 98.1  F (36.7  C), temperature source Temporal, resp. rate 16, SpO2 98%. There is no height or weight on file to calculate BMI.  Patient Active Problem List   Diagnosis    Idiopathic thrombocytopenic purpura (H)    Tobacco abuse    Obesity    Moderate single current episode of major depressive disorder (H)    PARMINDER (generalized anxiety disorder)    Panic disorder without agoraphobia    Knee injury, right, initial encounter       Wt Readings from Last 2 Encounters:   07/08/24 137 kg (302 lb)   06/18/24 137.2 kg (302 lb 8 oz)     BP Readings from Last 3 Encounters:   04/24/25 116/74   07/08/24 128/86   06/18/24 123/78         Current Outpatient Medications   Medication Sig Dispense Refill    FLUoxetine (PROZAC) 20 MG capsule TAKE 3 CAPSULES BY MOUTH EVERY  capsule 0    Ibuprofen (ADVIL PO) Take 400 mg by mouth      pantoprazole (PROTONIX) 40 MG EC tablet Take 1 tablet (40 mg) by mouth daily (Patient not taking: Reported on 7/8/2024) 90 tablet 0    propranolol (INDERAL) 10 MG tablet TAKE 1 TABLET (10 MG) BY MOUTH 3 TIMES A DAY IF NEEDED (PALPITATIONS/FAST HEARTRATE) 60 tablet 1     No current facility-administered medications for this visit.       Social History     Tobacco Use    Smoking status: Every Day     Current packs/day: 0.50     Average packs/day: 0.5 packs/day for 7.9 years (3.9 ttl pk-yrs)     Types: Other, Vaping Device, Cigarettes     Start date: 6/5/2017     Passive exposure: Current    Smokeless tobacco: Current   Vaping Use    Vaping status: Every Day    Substances: Nicotine   Substance Use Topics    Alcohol use: Not Currently     Comment: denies any use recently    Drug use: Not Currently     Types: Marijuana       Health Maintenance Due   Topic Date Due    ADVANCE CARE PLANNING  Never done    Pneumococcal Vaccine: Pediatrics (0 to 5 Years) and At-Risk  "Patients (6 to 49 Years) (1 of 2 - PCV) Never done    INFLUENZA VACCINE (1) 09/01/2024    COVID-19 Vaccine (2 - 2024-25 season) 09/01/2024    YEARLY PREVENTIVE VISIT  03/21/2025    CBC  03/21/2025       No results found for: \"PAP\"      April 24, 2025 2:49 PM    "

## 2025-04-24 NOTE — PATIENT INSTRUCTIONS
Good to see you today!    Consider giving papaya extract for the reflux symptoms.    Continue fluoxetine 60 mg daily.    Start taking bupropion/Wellbutrin 150 mg daily in the morning.  Watch caffeine in take when starting.  Follow-up with me 4 weeks after starting the medication.

## 2025-04-24 NOTE — PROGRESS NOTES
"  Assessment & Plan     Fredy was seen today for recheck medication and form request.    Diagnoses and all orders for this visit:    PARMINDER (generalized anxiety disorder)  -     buPROPion (WELLBUTRIN XL) 150 MG 24 hr tablet; Take 1 tablet (150 mg) by mouth every morning.  -     FLUoxetine (PROZAC) 20 MG capsule; Take 3 capsules (60 mg) by mouth daily.    Panic disorder without agoraphobia  -     buPROPion (WELLBUTRIN XL) 150 MG 24 hr tablet; Take 1 tablet (150 mg) by mouth every morning.  -     FLUoxetine (PROZAC) 20 MG capsule; Take 3 capsules (60 mg) by mouth daily.    Moderate single current episode of major depressive disorder (H)  -     buPROPion (WELLBUTRIN XL) 150 MG 24 hr tablet; Take 1 tablet (150 mg) by mouth every morning.  -     FLUoxetine (PROZAC) 20 MG capsule; Take 3 capsules (60 mg) by mouth daily.      Persistent, disruptive symptoms.  Continue fluoxetine 60 mg daily  Start bupropion 150 mg daily for depression, smoking cessation. Reviewed activating nature of this medication and will contact us if depression gets significantly worse.  Trial of papaya extract for GERD/reflux (not currently taking PPI).    Follow-up in 1 month for physical+ med check.    The longitudinal plan of care for the diagnosis(es)/condition(s) as documented were addressed during this visit. Due to the added complexity in care, I will continue to support Fredy in the subsequent management and with ongoing continuity of care.       Subjective   Fredy is a 24 year old, presenting for the following health issues:  Recheck Medication and Form Request    HPI      Depression/Anxiety follow-up  Current medication and dosage: fluoxetine 60 mg  Update since last visit:   Hasn't been feeling the worse, not feeling good  Still having some stomach and abdominal pain.  Currently taking Tums as needed - trying to cut back and avoid triggers. Feels like \"not being able to digest\"  Things have improved since taking the medication. Has been able to " cope with things better.    Missed doses: none  Side effects: none  Persistent symptoms: low motivation/energy to do things he knows will help his health. Occasional anxiety, not as bothersome as previous    Therapy: Has been going to therapy - was seeing weekly, now every other week appointments.    Fatigue is more noticeable.  Doesn't snore that aware of.    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          3/21/2024    11:17 AM 6/18/2024     2:59 PM 4/24/2025     2:40 PM   PHQ   PHQ-9 Total Score 6 6 7    Q9: Thoughts of better off dead/self-harm past 2 weeks Not at all Not at all Not at all       Patient-reported           3/12/2023     5:49 PM 6/18/2024     3:00 PM 4/24/2025     2:40 PM   PARMINDER-7 SCORE   Total Score 10 (moderate anxiety) 9 (mild anxiety) 6 (mild anxiety)   Total Score 10 9 6        Patient-reported            Objective    /74 (BP Location: Left arm, Patient Position: Sitting, Cuff Size: Adult Large)   Pulse 91   Temp 98.1  F (36.7  C) (Temporal)   Resp 16   SpO2 98%   There is no height or weight on file to calculate BMI.  Physical Exam   General: Well-appearing in NAD   HEENT: Normocephalic, atraumatic. Mucus membranes moist.   Resp: No respiratory distress   MSK: No peripheral edema.   Skin: No rashes or lesions noted.   Psych: Flat affect          Signed Electronically by: Airam Payne MD

## 2025-04-24 NOTE — TELEPHONE ENCOUNTER
Form completed at office visit, original sent home with patient. Copy sent to scanning for chart.    Amira Louise LPN   3:19 PM 04/24/25

## 2025-05-06 ENCOUNTER — OFFICE VISIT (OUTPATIENT)
Dept: FAMILY MEDICINE | Facility: CLINIC | Age: 25
End: 2025-05-06
Payer: COMMERCIAL

## 2025-05-06 VITALS
SYSTOLIC BLOOD PRESSURE: 118 MMHG | OXYGEN SATURATION: 96 % | DIASTOLIC BLOOD PRESSURE: 81 MMHG | RESPIRATION RATE: 16 BRPM | TEMPERATURE: 97.6 F | HEART RATE: 87 BPM

## 2025-05-06 DIAGNOSIS — M54.50 ACUTE MIDLINE LOW BACK PAIN WITHOUT SCIATICA: Primary | ICD-10-CM

## 2025-05-06 DIAGNOSIS — F32.1 MODERATE SINGLE CURRENT EPISODE OF MAJOR DEPRESSIVE DISORDER (H): ICD-10-CM

## 2025-05-06 RX ORDER — CYCLOBENZAPRINE HCL 10 MG
10 TABLET ORAL 3 TIMES DAILY PRN
Qty: 20 TABLET | Refills: 0 | Status: SHIPPED | OUTPATIENT
Start: 2025-05-06

## 2025-05-06 ASSESSMENT — PAIN SCALES - GENERAL: PAINLEVEL_OUTOF10: SEVERE PAIN (7)

## 2025-05-06 NOTE — LETTER
May 6, 2025    RE: Fredy Mora  [address] [place] [id number]      To whom it may concern:    Fredy Mora ws a seen at [facility] 05/06/25. Please excuse him from work due to his appointment.    Due to an injury, Fredy needs to be on a lifting restriction of no greater than 10 lbs. These lifting restrictions are in effect until 5/12/2025, unless further extended by our office.    Feel free to contact our office with any questions or concerns.    Sincerely,        Airam Payne MD

## 2025-05-06 NOTE — PROGRESS NOTES
Assessment & Plan     Fredy was seen today for back pain.    Diagnoses and all orders for this visit:    Acute midline low back pain without sciatica  -     cyclobenzaprine (FLEXERIL) 10 MG tablet; Take 1 tablet (10 mg) by mouth 3 times daily as needed for muscle spasms.    Moderate single current episode of major depressive disorder (H)      No red flag symptoms today and no imaging indicated at this time.  Supportive cares discussed  Cautious use of NSAIDs given GERD  OK for as needed cyclobenzaprine - reviewed no driving while taking this  Work lifting restrictions x 1 week placed  PT referral if no improvement in 2-4 weeks.    Has not yet started bupropion - will schedule follow-up once starting to take.    The longitudinal plan of care for the diagnosis(es)/condition(s) as documented were addressed during this visit. Due to the added complexity in care, I will continue to support Fredy in the subsequent management and with ongoing continuity of care.     Subjective   Fredy is a 24 year old, presenting for the following health issues:  Back Pain (Lifts things at work and he thinks he hurt it there, went away over the weekend but it came back /Low back pain)    HPI      Thurs-Fri  Lifts for work regularly.   Doesn't recall specific injury    Weekend went by - totally fine, no symptoms  Felt resting for a few days was helpful    Monday - restarting work had symptoms again  Mid-lower back  Feels like something is pinched  Hard to sit/stand up straight.  Sometimes goes a little bit lower.  6-7/10 intensity generally, 8/10 at it's most  Sitting at rest - can be pain free. Laying down at night is painful too  Lifting makes it worse  Sitting - makes it better. Laying down in some positions as well too.    Heat - Helps with the pain  Advil - didn't do much. Took 2 tablets of advil    No weakness, numbness/tingling legs  No changes in bowel/bladder function.  No fevers/chills.    Acid reflux - comes and goes.    No  history of prior back injury.   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        Objective    /81 (BP Location: Left arm, Patient Position: Sitting, Cuff Size: Adult Regular)   Pulse 87   Temp 97.6  F (36.4  C) (Skin)   Resp 16   SpO2 96%   There is no height or weight on file to calculate BMI.  Physical Exam   General: Well-appearing in NAD   HEENT: Normocephalic, atraumatic. Mucus membranes moist.   Resp: No respiratory distress   MSK: No peripheral edema.   Skin: No rashes or lesions noted.   Psych: Appropriate affect. Mood is good   Back: no midline tenderness to palpation. No parapsinal or SI joint tenderness. 2+ patellar reflexes bilaterally. Strength 5/5 in lower extremities. Negative SLR bilaterally      Signed Electronically by: Airam Payne MD

## 2025-05-06 NOTE — NURSING NOTE
24 year old    Chief Complaint   Patient presents with    Back Pain     Lifts things at work and he thinks he hurt it there, went away over the weekend but it came back   Low back pain        Blood pressure 118/81, pulse 87, temperature 97.6  F (36.4  C), temperature source Skin, resp. rate 16, SpO2 96%. There is no height or weight on file to calculate BMI.    Patient Active Problem List   Diagnosis    Idiopathic thrombocytopenic purpura (H)    Tobacco abuse    Obesity    Moderate single current episode of major depressive disorder (H)    PARMINDER (generalized anxiety disorder)    Panic disorder without agoraphobia    Knee injury, right, initial encounter          Wt Readings from Last 2 Encounters:   07/08/24 137 kg (302 lb)   06/18/24 137.2 kg (302 lb 8 oz)       BP Readings from Last 3 Encounters:   05/06/25 118/81   04/24/25 116/74   07/08/24 128/86             Current Outpatient Medications   Medication Sig Dispense Refill    buPROPion (WELLBUTRIN XL) 150 MG 24 hr tablet Take 1 tablet (150 mg) by mouth every morning. 90 tablet 0    FLUoxetine (PROZAC) 20 MG capsule Take 3 capsules (60 mg) by mouth daily. 270 capsule 0    Ibuprofen (ADVIL PO) Take 400 mg by mouth      pantoprazole (PROTONIX) 40 MG EC tablet Take 1 tablet (40 mg) by mouth daily 90 tablet 0    propranolol (INDERAL) 10 MG tablet TAKE 1 TABLET (10 MG) BY MOUTH 3 TIMES A DAY IF NEEDED (PALPITATIONS/FAST HEARTRATE) 60 tablet 1     No current facility-administered medications for this visit.          Social History     Tobacco Use    Smoking status: Every Day     Current packs/day: 0.50     Average packs/day: 0.5 packs/day for 7.9 years (4.0 ttl pk-yrs)     Types: Other, Vaping Device, Cigarettes     Start date: 6/5/2017     Passive exposure: Current    Smokeless tobacco: Current   Vaping Use    Vaping status: Every Day    Substances: Nicotine   Substance Use Topics    Alcohol use: Not Currently     Comment: denies any use recently    Drug use: Not  "Currently     Types: Marijuana          Health Maintenance Due   Topic Date Due    ADVANCE CARE PLANNING  Never done    Pneumococcal Vaccine: Pediatrics (0 to 5 Years) and At-Risk Patients (6 to 49 Years) (1 of 2 - PCV) Never done    COVID-19 Vaccine (2 - 2024-25 season) 09/01/2024    YEARLY PREVENTIVE VISIT  03/21/2025    CBC  03/21/2025        No results found for: \"PAP\"        May 6, 2025 1:20 PM        "

## 2025-05-06 NOTE — PATIENT INSTRUCTIONS
Good to see you today!    Continue to alternate ice and heat  1,000 mg (two extra strength tylenol) four times per day for pain control.    400-600 mg (2-3 tablets) of ibuprofen/Advil twice daily as needed for breakthrough pain.    Take the cyclobenzaprine (muscle relaxer) as needed at night for pain.  Do not drive while taking this medication.    Let me know if not back to normal in 2 weeks - will recommend PT  Let me know if we need to extend lifting restriction.

## 2025-05-10 ENCOUNTER — HEALTH MAINTENANCE LETTER (OUTPATIENT)
Age: 25
End: 2025-05-10

## 2025-07-22 DIAGNOSIS — F41.1 GAD (GENERALIZED ANXIETY DISORDER): ICD-10-CM

## 2025-07-22 DIAGNOSIS — F41.0 PANIC DISORDER WITHOUT AGORAPHOBIA: ICD-10-CM

## 2025-07-22 DIAGNOSIS — F32.1 MODERATE SINGLE CURRENT EPISODE OF MAJOR DEPRESSIVE DISORDER (H): ICD-10-CM

## 2025-07-23 RX ORDER — BUPROPION HYDROCHLORIDE 150 MG/1
150 TABLET ORAL EVERY MORNING
Qty: 90 TABLET | Refills: 0 | Status: SHIPPED | OUTPATIENT
Start: 2025-07-23

## 2025-07-23 NOTE — TELEPHONE ENCOUNTER
Medication requested: buPROPion (WELLBUTRIN XL) 150 MG 24 hr tablet   Last office visit: 5/6/2025  [facility] appointments: none  Medication last refilled: 4/24/2025; #90 + 0 refills    Medication requested: FLUoxetine (PROZAC) 20 MG capsule   Last office visit: 5/6/2025  [facility] appointments: none  Medication last refilled: 4/24/2025; #270 + 0 refills  Last qualifying labs:     BP Readings from Last 3 Encounters:   05/06/25 118/81   04/24/25 116/74   07/08/24 128/86      4/24/2025  2:40 PM   PHQ-9 / PARMINDER-7 Scores 8/2015 to present    PHQ-9 Score DocFlow 7 (AK)       4/24/2025  2:40 PM   PHQ-9 / PARMINDER-7 Scores 8/2015 to present    PARMINDER-7 Score DocFlow 6 (AK)      Prescription approved per [facility] Refill Protocol.    EDILMA Lopez, RN  07/23/25, 2:02 PM